# Patient Record
Sex: FEMALE | Race: WHITE | NOT HISPANIC OR LATINO | Employment: OTHER | URBAN - METROPOLITAN AREA
[De-identification: names, ages, dates, MRNs, and addresses within clinical notes are randomized per-mention and may not be internally consistent; named-entity substitution may affect disease eponyms.]

---

## 2019-01-01 ENCOUNTER — HOSPITAL ENCOUNTER (INPATIENT)
Facility: HOSPITAL | Age: 84
LOS: 2 days | Discharge: NURSING, REHAB OR LONG-TERM CARE | DRG: 641 | End: 2019-08-24
Attending: EMERGENCY MEDICINE | Admitting: INTERNAL MEDICINE
Payer: MEDICARE

## 2019-01-01 ENCOUNTER — APPOINTMENT (EMERGENCY)
Dept: RADIOLOGY | Facility: HOSPITAL | Age: 84
End: 2019-01-01
Payer: MEDICARE

## 2019-01-01 ENCOUNTER — APPOINTMENT (EMERGENCY)
Dept: RADIOLOGY | Facility: HOSPITAL | Age: 84
DRG: 641 | End: 2019-01-01
Payer: MEDICARE

## 2019-01-01 ENCOUNTER — HOSPITAL ENCOUNTER (EMERGENCY)
Facility: HOSPITAL | Age: 84
Discharge: HOME | End: 2019-07-12
Attending: EMERGENCY MEDICINE | Admitting: EMERGENCY MEDICINE
Payer: MEDICARE

## 2019-01-01 ENCOUNTER — CONSULT (OUTPATIENT)
Dept: CARDIOLOGY CLINIC | Facility: CLINIC | Age: 84
End: 2019-01-01
Payer: MEDICARE

## 2019-01-01 ENCOUNTER — APPOINTMENT (INPATIENT)
Dept: NON INVASIVE DIAGNOSTICS | Facility: HOSPITAL | Age: 84
DRG: 641 | End: 2019-01-01
Payer: MEDICARE

## 2019-01-01 VITALS
SYSTOLIC BLOOD PRESSURE: 124 MMHG | HEART RATE: 62 BPM | WEIGHT: 163.8 LBS | BODY MASS INDEX: 29.02 KG/M2 | OXYGEN SATURATION: 97 % | DIASTOLIC BLOOD PRESSURE: 60 MMHG | HEIGHT: 63 IN

## 2019-01-01 VITALS
WEIGHT: 167.99 LBS | HEART RATE: 84 BPM | TEMPERATURE: 98 F | HEIGHT: 63 IN | RESPIRATION RATE: 18 BRPM | BODY MASS INDEX: 29.77 KG/M2 | DIASTOLIC BLOOD PRESSURE: 72 MMHG | SYSTOLIC BLOOD PRESSURE: 148 MMHG | OXYGEN SATURATION: 95 %

## 2019-01-01 VITALS
TEMPERATURE: 99 F | RESPIRATION RATE: 16 BRPM | OXYGEN SATURATION: 96 % | BODY MASS INDEX: 31.89 KG/M2 | HEART RATE: 73 BPM | WEIGHT: 180 LBS | SYSTOLIC BLOOD PRESSURE: 140 MMHG | DIASTOLIC BLOOD PRESSURE: 70 MMHG

## 2019-01-01 DIAGNOSIS — I48.0 PAROXYSMAL ATRIAL FIBRILLATION (HCC): ICD-10-CM

## 2019-01-01 DIAGNOSIS — E87.6 HYPOKALEMIA: ICD-10-CM

## 2019-01-01 DIAGNOSIS — I48.0 PAROXYSMAL ATRIAL FIBRILLATION (HCC): Primary | ICD-10-CM

## 2019-01-01 DIAGNOSIS — F03.90 DEMENTIA (HCC): Primary | ICD-10-CM

## 2019-01-01 DIAGNOSIS — R05.9 COUGH: ICD-10-CM

## 2019-01-01 DIAGNOSIS — I48.91 ATRIAL FIBRILLATION WITH RVR (HCC): Primary | ICD-10-CM

## 2019-01-01 LAB
ALBUMIN SERPL BCP-MCNC: 3.2 G/DL (ref 3.5–5)
ALBUMIN SERPL BCP-MCNC: 4 G/DL (ref 3–5.2)
ALP SERPL-CCNC: 61 U/L (ref 46–116)
ALP SERPL-CCNC: 62 U/L (ref 43–122)
ALT SERPL W P-5'-P-CCNC: 17 U/L (ref 12–78)
ALT SERPL W P-5'-P-CCNC: 18 U/L (ref 9–52)
AMPHETAMINES SERPL QL SCN: NEGATIVE
ANION GAP SERPL CALCULATED.3IONS-SCNC: 10 MMOL/L (ref 5–14)
ANION GAP SERPL CALCULATED.3IONS-SCNC: 8 MMOL/L (ref 4–13)
ANION GAP SERPL CALCULATED.3IONS-SCNC: 8 MMOL/L (ref 4–13)
ANION GAP SERPL CALCULATED.3IONS-SCNC: 9 MMOL/L (ref 4–13)
AST SERPL W P-5'-P-CCNC: 15 U/L (ref 5–45)
AST SERPL W P-5'-P-CCNC: 24 U/L (ref 14–36)
ATRIAL RATE: 72 BPM
ATRIAL RATE: 74 BPM
ATRIAL RATE: 75 BPM
BACTERIA UR QL AUTO: ABNORMAL /HPF
BARBITURATES UR QL: NEGATIVE
BASOPHILS # BLD AUTO: 0.07 THOUSANDS/ΜL (ref 0–0.1)
BASOPHILS # BLD AUTO: 0.08 THOUSANDS/ΜL (ref 0–0.1)
BASOPHILS # BLD AUTO: 0.1 THOUSANDS/ΜL (ref 0–0.1)
BASOPHILS NFR BLD AUTO: 1 % (ref 0–1)
BENZODIAZ UR QL: NEGATIVE
BILIRUB SERPL-MCNC: 0.22 MG/DL (ref 0.2–1)
BILIRUB SERPL-MCNC: 0.4 MG/DL
BILIRUB UR QL STRIP: NEGATIVE
BUN SERPL-MCNC: 10 MG/DL (ref 5–25)
BUN SERPL-MCNC: 12 MG/DL (ref 5–25)
BUN SERPL-MCNC: 7 MG/DL (ref 5–25)
BUN SERPL-MCNC: 7 MG/DL (ref 5–25)
CALCIUM SERPL-MCNC: 9.2 MG/DL (ref 8.3–10.1)
CALCIUM SERPL-MCNC: 9.2 MG/DL (ref 8.3–10.1)
CALCIUM SERPL-MCNC: 9.3 MG/DL (ref 8.4–10.2)
CALCIUM SERPL-MCNC: 9.4 MG/DL (ref 8.3–10.1)
CHLORIDE SERPL-SCNC: 100 MMOL/L (ref 100–108)
CHLORIDE SERPL-SCNC: 106 MMOL/L (ref 100–108)
CHLORIDE SERPL-SCNC: 106 MMOL/L (ref 100–108)
CHLORIDE SERPL-SCNC: 96 MMOL/L (ref 97–108)
CLARITY UR: CLEAR
CO2 SERPL-SCNC: 27 MMOL/L (ref 21–32)
CO2 SERPL-SCNC: 29 MMOL/L (ref 21–32)
CO2 SERPL-SCNC: 30 MMOL/L (ref 21–32)
CO2 SERPL-SCNC: 31 MMOL/L (ref 22–30)
COCAINE UR QL: NEGATIVE
COLOR UR: ABNORMAL
CREAT SERPL-MCNC: 0.58 MG/DL (ref 0.6–1.2)
CREAT SERPL-MCNC: 0.64 MG/DL (ref 0.6–1.3)
CREAT SERPL-MCNC: 0.7 MG/DL (ref 0.6–1.3)
CREAT SERPL-MCNC: 0.84 MG/DL (ref 0.6–1.3)
EOSINOPHIL # BLD AUTO: 0.3 THOUSAND/ΜL (ref 0–0.4)
EOSINOPHIL # BLD AUTO: 0.76 THOUSAND/ΜL (ref 0–0.61)
EOSINOPHIL # BLD AUTO: 0.76 THOUSAND/ΜL (ref 0–0.61)
EOSINOPHIL NFR BLD AUTO: 2 % (ref 0–6)
EOSINOPHIL NFR BLD AUTO: 7 % (ref 0–6)
EOSINOPHIL NFR BLD AUTO: 9 % (ref 0–6)
ERYTHROCYTE [DISTWIDTH] IN BLOOD BY AUTOMATED COUNT: 13.2 % (ref 11.6–15.1)
ERYTHROCYTE [DISTWIDTH] IN BLOOD BY AUTOMATED COUNT: 13.3 % (ref 11.6–15.1)
ERYTHROCYTE [DISTWIDTH] IN BLOOD BY AUTOMATED COUNT: 13.3 % (ref 11.6–15.1)
ERYTHROCYTE [DISTWIDTH] IN BLOOD BY AUTOMATED COUNT: 14.8 %
ETHANOL SERPL-MCNC: <10 MG/DL (ref 0–10)
GFR SERPL CREATININE-BSD FRML MDRD: 62 ML/MIN/1.73SQ M
GFR SERPL CREATININE-BSD FRML MDRD: 77 ML/MIN/1.73SQ M
GFR SERPL CREATININE-BSD FRML MDRD: 79 ML/MIN/1.73SQ M
GFR SERPL CREATININE-BSD FRML MDRD: 83 ML/MIN/1.73SQ M
GLUCOSE SERPL-MCNC: 109 MG/DL (ref 65–140)
GLUCOSE SERPL-MCNC: 127 MG/DL (ref 65–140)
GLUCOSE SERPL-MCNC: 145 MG/DL (ref 70–99)
GLUCOSE SERPL-MCNC: 161 MG/DL (ref 65–140)
GLUCOSE SERPL-MCNC: 179 MG/DL (ref 65–140)
GLUCOSE UR STRIP-MCNC: NEGATIVE MG/DL
HCT VFR BLD AUTO: 37.6 % (ref 34.8–46.1)
HCT VFR BLD AUTO: 38.8 % (ref 34.8–46.1)
HCT VFR BLD AUTO: 38.8 % (ref 34.8–46.1)
HCT VFR BLD AUTO: 38.8 % (ref 36–46)
HGB BLD-MCNC: 12.2 G/DL (ref 11.5–15.4)
HGB BLD-MCNC: 12.4 G/DL (ref 11.5–15.4)
HGB BLD-MCNC: 13 G/DL (ref 11.5–15.4)
HGB BLD-MCNC: 13.2 G/DL (ref 12–16)
HGB UR QL STRIP.AUTO: NEGATIVE
HYALINE CASTS #/AREA URNS LPF: ABNORMAL /LPF
IMM GRANULOCYTES # BLD AUTO: 0.05 THOUSAND/UL (ref 0–0.2)
IMM GRANULOCYTES # BLD AUTO: 0.06 THOUSAND/UL (ref 0–0.2)
IMM GRANULOCYTES NFR BLD AUTO: 1 % (ref 0–2)
IMM GRANULOCYTES NFR BLD AUTO: 1 % (ref 0–2)
KETONES UR STRIP-MCNC: NEGATIVE MG/DL
LEUKOCYTE ESTERASE UR QL STRIP: 100
LYMPHOCYTES # BLD AUTO: 1.2 THOUSANDS/ΜL (ref 0.5–4)
LYMPHOCYTES # BLD AUTO: 2 THOUSANDS/ΜL (ref 0.6–4.47)
LYMPHOCYTES # BLD AUTO: 2.24 THOUSANDS/ΜL (ref 0.6–4.47)
LYMPHOCYTES NFR BLD AUTO: 10 % (ref 25–45)
LYMPHOCYTES NFR BLD AUTO: 21 % (ref 14–44)
LYMPHOCYTES NFR BLD AUTO: 22 % (ref 14–44)
MAGNESIUM SERPL-MCNC: 1.8 MG/DL (ref 1.6–2.3)
MCH RBC QN AUTO: 29.4 PG (ref 26.8–34.3)
MCH RBC QN AUTO: 29.7 PG (ref 26.8–34.3)
MCH RBC QN AUTO: 30.1 PG (ref 26.8–34.3)
MCH RBC QN AUTO: 30.1 PG (ref 26.8–34.3)
MCHC RBC AUTO-ENTMCNC: 31.4 G/DL (ref 31.4–37.4)
MCHC RBC AUTO-ENTMCNC: 33 G/DL (ref 31.4–37.4)
MCHC RBC AUTO-ENTMCNC: 33.5 G/DL (ref 31.4–37.4)
MCHC RBC AUTO-ENTMCNC: 34.2 G/DL (ref 31.4–37.4)
MCV RBC AUTO: 87 FL (ref 80–100)
MCV RBC AUTO: 90 FL (ref 82–98)
MCV RBC AUTO: 91 FL (ref 82–98)
MCV RBC AUTO: 94 FL (ref 82–98)
METHADONE UR QL: NEGATIVE
MONOCYTES # BLD AUTO: 0.7 THOUSAND/ΜL (ref 0.2–0.9)
MONOCYTES # BLD AUTO: 0.76 THOUSAND/ΜL (ref 0.17–1.22)
MONOCYTES # BLD AUTO: 0.88 THOUSAND/ΜL (ref 0.17–1.22)
MONOCYTES NFR BLD AUTO: 5 % (ref 1–10)
MONOCYTES NFR BLD AUTO: 8 % (ref 4–12)
MONOCYTES NFR BLD AUTO: 9 % (ref 4–12)
MRSA NOSE QL CULT: ABNORMAL
MRSA NOSE QL CULT: ABNORMAL
NEUTROPHILS # BLD AUTO: 10.6 THOUSANDS/ΜL (ref 1.8–7.8)
NEUTROPHILS # BLD AUTO: 5.34 THOUSANDS/ΜL (ref 1.85–7.62)
NEUTROPHILS # BLD AUTO: 6.76 THOUSANDS/ΜL (ref 1.85–7.62)
NEUTS SEG NFR BLD AUTO: 58 % (ref 43–75)
NEUTS SEG NFR BLD AUTO: 62 % (ref 43–75)
NEUTS SEG NFR BLD AUTO: 83 % (ref 45–65)
NITRITE UR QL STRIP: NEGATIVE
NON-SQ EPI CELLS URNS QL MICRO: ABNORMAL /HPF
NRBC BLD AUTO-RTO: 0 /100 WBCS
NRBC BLD AUTO-RTO: 0 /100 WBCS
OPIATES UR QL SCN: NEGATIVE
P AXIS: 45 DEGREES
P AXIS: 69 DEGREES
PCP UR QL: NEGATIVE
PH UR STRIP.AUTO: 7 [PH]
PLATELET # BLD AUTO: 220 THOUSANDS/UL (ref 150–450)
PLATELET # BLD AUTO: 221 THOUSANDS/UL (ref 149–390)
PLATELET # BLD AUTO: 231 THOUSANDS/UL (ref 149–390)
PLATELET # BLD AUTO: 235 THOUSANDS/UL (ref 149–390)
PMV BLD AUTO: 7.2 FL (ref 8.9–12.7)
PMV BLD AUTO: 8.8 FL (ref 8.9–12.7)
PMV BLD AUTO: 8.9 FL (ref 8.9–12.7)
PMV BLD AUTO: 9 FL (ref 8.9–12.7)
POTASSIUM SERPL-SCNC: 2.5 MMOL/L (ref 3.5–5.3)
POTASSIUM SERPL-SCNC: 2.9 MMOL/L (ref 3.5–5.3)
POTASSIUM SERPL-SCNC: 2.9 MMOL/L (ref 3.5–5.3)
POTASSIUM SERPL-SCNC: 3 MMOL/L (ref 3.6–5)
POTASSIUM SERPL-SCNC: 4.1 MMOL/L (ref 3.5–5.3)
PR INTERVAL: 138 MS
PR INTERVAL: 154 MS
PROT SERPL-MCNC: 6.7 G/DL (ref 6.4–8.2)
PROT SERPL-MCNC: 7 G/DL (ref 5.9–8.4)
PROT UR STRIP-MCNC: ABNORMAL MG/DL
QRS AXIS: 2 DEGREES
QRS AXIS: 32 DEGREES
QRS AXIS: 39 DEGREES
QRSD INTERVAL: 76 MS
QRSD INTERVAL: 82 MS
QRSD INTERVAL: 92 MS
QT INTERVAL: 312 MS
QT INTERVAL: 440 MS
QT INTERVAL: 454 MS
QTC INTERVAL: 430 MS
QTC INTERVAL: 481 MS
QTC INTERVAL: 503 MS
RBC # BLD AUTO: 4.12 MILLION/UL (ref 3.81–5.12)
RBC # BLD AUTO: 4.15 MILLION/UL (ref 3.81–5.12)
RBC # BLD AUTO: 4.32 MILLION/UL (ref 3.81–5.12)
RBC # BLD AUTO: 4.46 MILLION/UL (ref 4–5.2)
RBC #/AREA URNS AUTO: ABNORMAL /HPF
SODIUM SERPL-SCNC: 137 MMOL/L (ref 137–147)
SODIUM SERPL-SCNC: 138 MMOL/L (ref 136–145)
SODIUM SERPL-SCNC: 141 MMOL/L (ref 136–145)
SODIUM SERPL-SCNC: 144 MMOL/L (ref 136–145)
SP GR UR STRIP.AUTO: 1.01 (ref 1–1.04)
T WAVE AXIS: -72 DEGREES
T WAVE AXIS: 25 DEGREES
T WAVE AXIS: 57 DEGREES
THC UR QL: NEGATIVE
TROPONIN I SERPL-MCNC: <0.02 NG/ML
TSH SERPL DL<=0.05 MIU/L-ACNC: 1.49 UIU/ML (ref 0.47–4.68)
UROBILINOGEN UA: NEGATIVE MG/DL
VENTRICULAR RATE: 114 BPM
VENTRICULAR RATE: 72 BPM
VENTRICULAR RATE: 74 BPM
WBC # BLD AUTO: 10.78 THOUSAND/UL (ref 4.31–10.16)
WBC # BLD AUTO: 12.8 THOUSAND/UL (ref 4.31–10.16)
WBC # BLD AUTO: 8.87 THOUSAND/UL (ref 4.31–10.16)
WBC # BLD AUTO: 8.98 THOUSAND/UL (ref 4.31–10.16)
WBC #/AREA URNS AUTO: ABNORMAL /HPF

## 2019-01-01 PROCEDURE — 93010 ELECTROCARDIOGRAM REPORT: CPT | Performed by: INTERNAL MEDICINE

## 2019-01-01 PROCEDURE — 80053 COMPREHEN METABOLIC PANEL: CPT | Performed by: EMERGENCY MEDICINE

## 2019-01-01 PROCEDURE — 82948 REAGENT STRIP/BLOOD GLUCOSE: CPT

## 2019-01-01 PROCEDURE — 36415 COLL VENOUS BLD VENIPUNCTURE: CPT | Performed by: EMERGENCY MEDICINE

## 2019-01-01 PROCEDURE — 93005 ELECTROCARDIOGRAM TRACING: CPT

## 2019-01-01 PROCEDURE — 99285 EMERGENCY DEPT VISIT HI MDM: CPT

## 2019-01-01 PROCEDURE — 84484 ASSAY OF TROPONIN QUANT: CPT | Performed by: EMERGENCY MEDICINE

## 2019-01-01 PROCEDURE — 97163 PT EVAL HIGH COMPLEX 45 MIN: CPT

## 2019-01-01 PROCEDURE — 99214 OFFICE O/P EST MOD 30 MIN: CPT

## 2019-01-01 PROCEDURE — 36415 COLL VENOUS BLD VENIPUNCTURE: CPT

## 2019-01-01 PROCEDURE — 85025 COMPLETE CBC W/AUTO DIFF WBC: CPT | Performed by: EMERGENCY MEDICINE

## 2019-01-01 PROCEDURE — 99291 CRITICAL CARE FIRST HOUR: CPT | Performed by: EMERGENCY MEDICINE

## 2019-01-01 PROCEDURE — 85027 COMPLETE CBC AUTOMATED: CPT | Performed by: INTERNAL MEDICINE

## 2019-01-01 PROCEDURE — G8978 MOBILITY CURRENT STATUS: HCPCS

## 2019-01-01 PROCEDURE — 81001 URINALYSIS AUTO W/SCOPE: CPT | Performed by: EMERGENCY MEDICINE

## 2019-01-01 PROCEDURE — 99223 1ST HOSP IP/OBS HIGH 75: CPT | Performed by: INTERNAL MEDICINE

## 2019-01-01 PROCEDURE — 83735 ASSAY OF MAGNESIUM: CPT | Performed by: EMERGENCY MEDICINE

## 2019-01-01 PROCEDURE — 99222 1ST HOSP IP/OBS MODERATE 55: CPT | Performed by: INTERNAL MEDICINE

## 2019-01-01 PROCEDURE — 84443 ASSAY THYROID STIM HORMONE: CPT | Performed by: EMERGENCY MEDICINE

## 2019-01-01 PROCEDURE — NC001 PR NO CHARGE: Performed by: NURSE PRACTITIONER

## 2019-01-01 PROCEDURE — 84132 ASSAY OF SERUM POTASSIUM: CPT | Performed by: INTERNAL MEDICINE

## 2019-01-01 PROCEDURE — 80048 BASIC METABOLIC PNL TOTAL CA: CPT | Performed by: INTERNAL MEDICINE

## 2019-01-01 PROCEDURE — 93306 TTE W/DOPPLER COMPLETE: CPT

## 2019-01-01 PROCEDURE — G8980 MOBILITY D/C STATUS: HCPCS

## 2019-01-01 PROCEDURE — 99284 EMERGENCY DEPT VISIT MOD MDM: CPT | Performed by: EMERGENCY MEDICINE

## 2019-01-01 PROCEDURE — 99232 SBSQ HOSP IP/OBS MODERATE 35: CPT

## 2019-01-01 PROCEDURE — G8979 MOBILITY GOAL STATUS: HCPCS

## 2019-01-01 PROCEDURE — 87081 CULTURE SCREEN ONLY: CPT | Performed by: INTERNAL MEDICINE

## 2019-01-01 PROCEDURE — 1123F ACP DISCUSS/DSCN MKR DOCD: CPT

## 2019-01-01 PROCEDURE — 99232 SBSQ HOSP IP/OBS MODERATE 35: CPT | Performed by: INTERNAL MEDICINE

## 2019-01-01 PROCEDURE — 96360 HYDRATION IV INFUSION INIT: CPT

## 2019-01-01 PROCEDURE — 99239 HOSP IP/OBS DSCHRG MGMT >30: CPT | Performed by: INTERNAL MEDICINE

## 2019-01-01 PROCEDURE — 71045 X-RAY EXAM CHEST 1 VIEW: CPT

## 2019-01-01 PROCEDURE — 85025 COMPLETE CBC W/AUTO DIFF WBC: CPT | Performed by: INTERNAL MEDICINE

## 2019-01-01 PROCEDURE — 87147 CULTURE TYPE IMMUNOLOGIC: CPT | Performed by: INTERNAL MEDICINE

## 2019-01-01 PROCEDURE — 80307 DRUG TEST PRSMV CHEM ANLYZR: CPT | Performed by: EMERGENCY MEDICINE

## 2019-01-01 PROCEDURE — 93000 ELECTROCARDIOGRAM COMPLETE: CPT

## 2019-01-01 PROCEDURE — 71046 X-RAY EXAM CHEST 2 VIEWS: CPT

## 2019-01-01 PROCEDURE — 80320 DRUG SCREEN QUANTALCOHOLS: CPT | Performed by: EMERGENCY MEDICINE

## 2019-01-01 RX ORDER — POTASSIUM CHLORIDE 20MEQ/15ML
20 LIQUID (ML) ORAL ONCE
Status: COMPLETED | OUTPATIENT
Start: 2019-01-01 | End: 2019-01-01

## 2019-01-01 RX ORDER — POTASSIUM CHLORIDE 20MEQ/15ML
40 LIQUID (ML) ORAL ONCE
Status: COMPLETED | OUTPATIENT
Start: 2019-01-01 | End: 2019-01-01

## 2019-01-01 RX ORDER — PREDNISONE 1 MG/1
5 TABLET ORAL DAILY
Status: DISCONTINUED | OUTPATIENT
Start: 2019-01-01 | End: 2019-01-01 | Stop reason: HOSPADM

## 2019-01-01 RX ORDER — MINERAL OIL 100 G/100G
1 OIL RECTAL DAILY PRN
COMMUNITY

## 2019-01-01 RX ORDER — PANTOPRAZOLE SODIUM 40 MG/1
40 TABLET, DELAYED RELEASE ORAL
Status: DISCONTINUED | OUTPATIENT
Start: 2019-01-01 | End: 2019-01-01 | Stop reason: HOSPADM

## 2019-01-01 RX ORDER — METOPROLOL TARTRATE 50 MG/1
50 TABLET, FILM COATED ORAL 2 TIMES DAILY
COMMUNITY

## 2019-01-01 RX ORDER — POTASSIUM CHLORIDE 20 MEQ/1
40 TABLET, EXTENDED RELEASE ORAL 2 TIMES DAILY
Status: COMPLETED | OUTPATIENT
Start: 2019-01-01 | End: 2019-01-01

## 2019-01-01 RX ORDER — PREDNISONE 1 MG/1
TABLET ORAL
COMMUNITY

## 2019-01-01 RX ORDER — LORAZEPAM 0.5 MG/1
0.5 TABLET ORAL EVERY 8 HOURS PRN
Status: DISCONTINUED | OUTPATIENT
Start: 2019-01-01 | End: 2019-01-01 | Stop reason: HOSPADM

## 2019-01-01 RX ORDER — BENZONATATE 100 MG/1
100 CAPSULE ORAL EVERY 8 HOURS
Qty: 21 CAPSULE | Refills: 0 | Status: SHIPPED | OUTPATIENT
Start: 2019-01-01 | End: 2019-01-01

## 2019-01-01 RX ORDER — DONEPEZIL HYDROCHLORIDE 10 MG/1
10 TABLET, FILM COATED ORAL
COMMUNITY

## 2019-01-01 RX ORDER — DONEPEZIL HYDROCHLORIDE 10 MG/1
10 TABLET, FILM COATED ORAL
Status: DISCONTINUED | OUTPATIENT
Start: 2019-01-01 | End: 2019-01-01 | Stop reason: HOSPADM

## 2019-01-01 RX ORDER — POTASSIUM CHLORIDE 14.9 MG/ML
20 INJECTION INTRAVENOUS ONCE
Status: COMPLETED | OUTPATIENT
Start: 2019-01-01 | End: 2019-01-01

## 2019-01-01 RX ORDER — AMLODIPINE BESYLATE 10 MG/1
10 TABLET ORAL DAILY
Status: DISCONTINUED | OUTPATIENT
Start: 2019-01-01 | End: 2019-01-01 | Stop reason: HOSPADM

## 2019-01-01 RX ORDER — LANOLIN ALCOHOL/MO/W.PET/CERES
3 CREAM (GRAM) TOPICAL
COMMUNITY

## 2019-01-01 RX ORDER — DULOXETIN HYDROCHLORIDE 60 MG/1
CAPSULE, DELAYED RELEASE ORAL
COMMUNITY
End: 2019-01-01

## 2019-01-01 RX ORDER — LANOLIN ALCOHOL/MO/W.PET/CERES
3 CREAM (GRAM) TOPICAL
Status: DISCONTINUED | OUTPATIENT
Start: 2019-01-01 | End: 2019-01-01 | Stop reason: HOSPADM

## 2019-01-01 RX ORDER — DULOXETIN HYDROCHLORIDE 30 MG/1
30 CAPSULE, DELAYED RELEASE ORAL DAILY
Status: DISCONTINUED | OUTPATIENT
Start: 2019-01-01 | End: 2019-01-01 | Stop reason: HOSPADM

## 2019-01-01 RX ORDER — POTASSIUM CHLORIDE 750 MG/1
40 TABLET, EXTENDED RELEASE ORAL ONCE
Status: DISCONTINUED | OUTPATIENT
Start: 2019-01-01 | End: 2019-01-01

## 2019-01-01 RX ORDER — BISACODYL 10 MG
10 SUPPOSITORY, RECTAL RECTAL
COMMUNITY

## 2019-01-01 RX ORDER — POTASSIUM CHLORIDE 14.9 MG/ML
20 INJECTION INTRAVENOUS ONCE
Status: DISCONTINUED | OUTPATIENT
Start: 2019-01-01 | End: 2019-01-01

## 2019-01-01 RX ORDER — METOPROLOL TARTRATE 50 MG/1
50 TABLET, FILM COATED ORAL EVERY 12 HOURS SCHEDULED
Status: DISCONTINUED | OUTPATIENT
Start: 2019-01-01 | End: 2019-01-01 | Stop reason: HOSPADM

## 2019-01-01 RX ORDER — HYDROCHLOROTHIAZIDE 12.5 MG/1
25 TABLET ORAL
COMMUNITY
End: 2019-01-01 | Stop reason: HOSPADM

## 2019-01-01 RX ORDER — AMLODIPINE BESYLATE 10 MG/1
TABLET ORAL
COMMUNITY
End: 2019-01-01

## 2019-01-01 RX ORDER — LORAZEPAM 0.5 MG/1
TABLET ORAL
COMMUNITY

## 2019-01-01 RX ADMIN — PREDNISONE 5 MG: 5 TABLET ORAL at 12:08

## 2019-01-01 RX ADMIN — PANTOPRAZOLE SODIUM 40 MG: 40 TABLET, DELAYED RELEASE ORAL at 06:06

## 2019-01-01 RX ADMIN — MELATONIN 3 MG: 3 TAB ORAL at 21:53

## 2019-01-01 RX ADMIN — DULOXETINE HYDROCHLORIDE 30 MG: 30 CAPSULE, DELAYED RELEASE ORAL at 09:54

## 2019-01-01 RX ADMIN — POTASSIUM CHLORIDE 20 MEQ: 20 SOLUTION ORAL at 08:11

## 2019-01-01 RX ADMIN — METOPROLOL TARTRATE 50 MG: 50 TABLET, FILM COATED ORAL at 09:54

## 2019-01-01 RX ADMIN — PREDNISONE 5 MG: 5 TABLET ORAL at 09:54

## 2019-01-01 RX ADMIN — METOPROLOL TARTRATE 50 MG: 50 TABLET, FILM COATED ORAL at 09:31

## 2019-01-01 RX ADMIN — PANTOPRAZOLE SODIUM 40 MG: 40 TABLET, DELAYED RELEASE ORAL at 06:01

## 2019-01-01 RX ADMIN — DULOXETINE HYDROCHLORIDE 30 MG: 30 CAPSULE, DELAYED RELEASE ORAL at 12:08

## 2019-01-01 RX ADMIN — POTASSIUM CHLORIDE 40 MEQ: 1500 TABLET, EXTENDED RELEASE ORAL at 09:55

## 2019-01-01 RX ADMIN — AMLODIPINE BESYLATE 10 MG: 10 TABLET ORAL at 12:08

## 2019-01-01 RX ADMIN — APIXABAN 2.5 MG: 2.5 TABLET, FILM COATED ORAL at 12:08

## 2019-01-01 RX ADMIN — LORAZEPAM 0.5 MG: 0.5 TABLET ORAL at 21:53

## 2019-01-01 RX ADMIN — APIXABAN 2.5 MG: 2.5 TABLET, FILM COATED ORAL at 09:31

## 2019-01-01 RX ADMIN — DONEPEZIL HYDROCHLORIDE 10 MG: 10 TABLET, FILM COATED ORAL at 21:42

## 2019-01-01 RX ADMIN — POTASSIUM CHLORIDE 20 MEQ: 200 INJECTION, SOLUTION INTRAVENOUS at 06:55

## 2019-01-01 RX ADMIN — DULOXETINE HYDROCHLORIDE 30 MG: 30 CAPSULE, DELAYED RELEASE ORAL at 09:31

## 2019-01-01 RX ADMIN — POTASSIUM CHLORIDE 40 MEQ: 1500 TABLET, EXTENDED RELEASE ORAL at 17:53

## 2019-01-01 RX ADMIN — AMLODIPINE BESYLATE 10 MG: 10 TABLET ORAL at 09:31

## 2019-01-01 RX ADMIN — POTASSIUM CHLORIDE 20 MEQ: 20 SOLUTION ORAL at 06:37

## 2019-01-01 RX ADMIN — MELATONIN 3 MG: 3 TAB ORAL at 21:43

## 2019-01-01 RX ADMIN — AMLODIPINE BESYLATE 10 MG: 10 TABLET ORAL at 09:54

## 2019-01-01 RX ADMIN — METOPROLOL TARTRATE 50 MG: 50 TABLET, FILM COATED ORAL at 12:08

## 2019-01-01 RX ADMIN — DONEPEZIL HYDROCHLORIDE 10 MG: 10 TABLET, FILM COATED ORAL at 21:53

## 2019-01-01 RX ADMIN — PANTOPRAZOLE SODIUM 40 MG: 40 TABLET, DELAYED RELEASE ORAL at 12:08

## 2019-01-01 RX ADMIN — METOPROLOL TARTRATE 50 MG: 50 TABLET, FILM COATED ORAL at 21:53

## 2019-01-01 RX ADMIN — POTASSIUM CHLORIDE 20 MEQ: 200 INJECTION, SOLUTION INTRAVENOUS at 08:38

## 2019-01-01 RX ADMIN — APIXABAN 2.5 MG: 2.5 TABLET, FILM COATED ORAL at 18:24

## 2019-01-01 RX ADMIN — APIXABAN 2.5 MG: 2.5 TABLET, FILM COATED ORAL at 09:54

## 2019-01-01 RX ADMIN — POTASSIUM CHLORIDE 40 MEQ: 20 SOLUTION ORAL at 17:36

## 2019-01-01 RX ADMIN — PREDNISONE 5 MG: 5 TABLET ORAL at 09:31

## 2019-01-01 RX ADMIN — APIXABAN 2.5 MG: 2.5 TABLET, FILM COATED ORAL at 17:53

## 2019-01-01 RX ADMIN — SODIUM CHLORIDE 500 ML: 0.9 INJECTION, SOLUTION INTRAVENOUS at 07:11

## 2019-01-01 RX ADMIN — METOPROLOL TARTRATE 50 MG: 50 TABLET, FILM COATED ORAL at 21:43

## 2019-02-05 ENCOUNTER — APPOINTMENT (EMERGENCY)
Dept: RADIOLOGY | Facility: HOSPITAL | Age: 84
DRG: 206 | End: 2019-02-05
Payer: MEDICARE

## 2019-02-05 ENCOUNTER — HOSPITAL ENCOUNTER (INPATIENT)
Facility: HOSPITAL | Age: 84
LOS: 2 days | Discharge: NURSING, REHAB OR LONG-TERM CARE | DRG: 206 | End: 2019-02-07
Attending: EMERGENCY MEDICINE | Admitting: INTERNAL MEDICINE
Payer: MEDICARE

## 2019-02-05 DIAGNOSIS — R06.03 RESPIRATORY DISTRESS: Primary | ICD-10-CM

## 2019-02-05 DIAGNOSIS — R23.0 CYANOSIS: ICD-10-CM

## 2019-02-05 PROBLEM — F03.90 DEMENTIA (HCC): Status: ACTIVE | Noted: 2019-02-05

## 2019-02-05 PROBLEM — I10 ESSENTIAL HYPERTENSION: Status: ACTIVE | Noted: 2017-04-20

## 2019-02-05 PROBLEM — K22.70 BARRETT'S ESOPHAGUS: Status: ACTIVE | Noted: 2019-02-05

## 2019-02-05 PROBLEM — R09.89: Status: ACTIVE | Noted: 2019-02-05

## 2019-02-05 PROBLEM — M31.6 TEMPORAL ARTERITIS (HCC): Status: ACTIVE | Noted: 2017-04-20

## 2019-02-05 PROBLEM — K21.9 GERD (GASTROESOPHAGEAL REFLUX DISEASE): Status: ACTIVE | Noted: 2019-02-05

## 2019-02-05 LAB
ANION GAP SERPL CALCULATED.3IONS-SCNC: 10 MMOL/L (ref 4–13)
BASOPHILS # BLD AUTO: 0.08 THOUSANDS/ΜL (ref 0–0.1)
BASOPHILS NFR BLD AUTO: 1 % (ref 0–1)
BUN SERPL-MCNC: 16 MG/DL (ref 5–25)
CALCIUM SERPL-MCNC: 9.2 MG/DL (ref 8.3–10.1)
CHLORIDE SERPL-SCNC: 100 MMOL/L (ref 100–108)
CO2 SERPL-SCNC: 28 MMOL/L (ref 21–32)
CREAT SERPL-MCNC: 0.97 MG/DL (ref 0.6–1.3)
EOSINOPHIL # BLD AUTO: 0.3 THOUSAND/ΜL (ref 0–0.61)
EOSINOPHIL NFR BLD AUTO: 3 % (ref 0–6)
ERYTHROCYTE [DISTWIDTH] IN BLOOD BY AUTOMATED COUNT: 13.9 % (ref 11.6–15.1)
GFR SERPL CREATININE-BSD FRML MDRD: 52 ML/MIN/1.73SQ M
GLUCOSE SERPL-MCNC: 147 MG/DL (ref 65–140)
HCT VFR BLD AUTO: 37.9 % (ref 34.8–46.1)
HGB BLD-MCNC: 12.1 G/DL (ref 11.5–15.4)
IMM GRANULOCYTES # BLD AUTO: 0.07 THOUSAND/UL (ref 0–0.2)
IMM GRANULOCYTES NFR BLD AUTO: 1 % (ref 0–2)
LYMPHOCYTES # BLD AUTO: 0.63 THOUSANDS/ΜL (ref 0.6–4.47)
LYMPHOCYTES NFR BLD AUTO: 5 % (ref 14–44)
MAGNESIUM SERPL-MCNC: 1.9 MG/DL (ref 1.6–2.6)
MCH RBC QN AUTO: 29.2 PG (ref 26.8–34.3)
MCHC RBC AUTO-ENTMCNC: 31.9 G/DL (ref 31.4–37.4)
MCV RBC AUTO: 91 FL (ref 82–98)
MONOCYTES # BLD AUTO: 0.69 THOUSAND/ΜL (ref 0.17–1.22)
MONOCYTES NFR BLD AUTO: 6 % (ref 4–12)
NEUTROPHILS # BLD AUTO: 10.08 THOUSANDS/ΜL (ref 1.85–7.62)
NEUTS SEG NFR BLD AUTO: 84 % (ref 43–75)
NRBC BLD AUTO-RTO: 0 /100 WBCS
PLATELET # BLD AUTO: 199 THOUSANDS/UL (ref 149–390)
PMV BLD AUTO: 9.4 FL (ref 8.9–12.7)
POTASSIUM SERPL-SCNC: 3.6 MMOL/L (ref 3.5–5.3)
RBC # BLD AUTO: 4.15 MILLION/UL (ref 3.81–5.12)
SODIUM SERPL-SCNC: 138 MMOL/L (ref 136–145)
TROPONIN I SERPL-MCNC: 0.04 NG/ML
WBC # BLD AUTO: 11.85 THOUSAND/UL (ref 4.31–10.16)

## 2019-02-05 PROCEDURE — 99223 1ST HOSP IP/OBS HIGH 75: CPT | Performed by: INTERNAL MEDICINE

## 2019-02-05 PROCEDURE — 71045 X-RAY EXAM CHEST 1 VIEW: CPT

## 2019-02-05 PROCEDURE — 93005 ELECTROCARDIOGRAM TRACING: CPT

## 2019-02-05 PROCEDURE — 80048 BASIC METABOLIC PNL TOTAL CA: CPT | Performed by: PHYSICIAN ASSISTANT

## 2019-02-05 PROCEDURE — 99285 EMERGENCY DEPT VISIT HI MDM: CPT

## 2019-02-05 PROCEDURE — 83735 ASSAY OF MAGNESIUM: CPT | Performed by: PHYSICIAN ASSISTANT

## 2019-02-05 PROCEDURE — 96374 THER/PROPH/DIAG INJ IV PUSH: CPT

## 2019-02-05 PROCEDURE — 36415 COLL VENOUS BLD VENIPUNCTURE: CPT | Performed by: PHYSICIAN ASSISTANT

## 2019-02-05 PROCEDURE — 85025 COMPLETE CBC W/AUTO DIFF WBC: CPT | Performed by: PHYSICIAN ASSISTANT

## 2019-02-05 PROCEDURE — 84484 ASSAY OF TROPONIN QUANT: CPT | Performed by: PHYSICIAN ASSISTANT

## 2019-02-05 PROCEDURE — 71046 X-RAY EXAM CHEST 2 VIEWS: CPT

## 2019-02-05 RX ORDER — PREDNISONE 1 MG/1
5 TABLET ORAL DAILY
Status: DISCONTINUED | OUTPATIENT
Start: 2019-02-06 | End: 2019-02-07 | Stop reason: HOSPADM

## 2019-02-05 RX ORDER — METOPROLOL TARTRATE 50 MG/1
50 TABLET, FILM COATED ORAL EVERY 12 HOURS SCHEDULED
COMMUNITY
End: 2019-01-01

## 2019-02-05 RX ORDER — DONEPEZIL HYDROCHLORIDE 10 MG/1
10 TABLET, FILM COATED ORAL
COMMUNITY
End: 2019-01-01

## 2019-02-05 RX ORDER — ALBUTEROL SULFATE 2.5 MG/3ML
2.5 SOLUTION RESPIRATORY (INHALATION) DAILY
COMMUNITY

## 2019-02-05 RX ORDER — LISINOPRIL 20 MG/1
20 TABLET ORAL DAILY
Status: DISCONTINUED | OUTPATIENT
Start: 2019-02-06 | End: 2019-02-07 | Stop reason: HOSPADM

## 2019-02-05 RX ORDER — AMLODIPINE BESYLATE 10 MG/1
10 TABLET ORAL DAILY
COMMUNITY

## 2019-02-05 RX ORDER — HYDROCHLOROTHIAZIDE 12.5 MG/1
12.5 TABLET ORAL DAILY
COMMUNITY
End: 2019-01-01

## 2019-02-05 RX ORDER — LORAZEPAM 2 MG/ML
0.25 INJECTION INTRAMUSCULAR ONCE
Status: COMPLETED | OUTPATIENT
Start: 2019-02-05 | End: 2019-02-05

## 2019-02-05 RX ORDER — LORAZEPAM 0.5 MG/1
0.5 TABLET ORAL EVERY 8 HOURS PRN
Status: DISCONTINUED | OUTPATIENT
Start: 2019-02-05 | End: 2019-02-07 | Stop reason: HOSPADM

## 2019-02-05 RX ORDER — METOPROLOL TARTRATE 50 MG/1
50 TABLET, FILM COATED ORAL EVERY 12 HOURS SCHEDULED
Status: DISCONTINUED | OUTPATIENT
Start: 2019-02-05 | End: 2019-02-07 | Stop reason: HOSPADM

## 2019-02-05 RX ORDER — IBUPROFEN 200 MG
200 TABLET ORAL ONCE
COMMUNITY
End: 2019-02-07 | Stop reason: HOSPADM

## 2019-02-05 RX ORDER — DONEPEZIL HYDROCHLORIDE 10 MG/1
10 TABLET, FILM COATED ORAL
Status: DISCONTINUED | OUTPATIENT
Start: 2019-02-05 | End: 2019-02-07 | Stop reason: HOSPADM

## 2019-02-05 RX ORDER — PHENOL 1.4 %
600 AEROSOL, SPRAY (ML) MUCOUS MEMBRANE EVERY 6 HOURS PRN
COMMUNITY

## 2019-02-05 RX ORDER — ALBUTEROL SULFATE 2.5 MG/3ML
2.5 SOLUTION RESPIRATORY (INHALATION) EVERY 4 HOURS PRN
Status: DISCONTINUED | OUTPATIENT
Start: 2019-02-05 | End: 2019-02-07 | Stop reason: HOSPADM

## 2019-02-05 RX ORDER — ALBUTEROL SULFATE 2.5 MG/3ML
2.5 SOLUTION RESPIRATORY (INHALATION) DAILY
Status: DISCONTINUED | OUTPATIENT
Start: 2019-02-06 | End: 2019-02-05

## 2019-02-05 RX ORDER — AMLODIPINE BESYLATE 10 MG/1
10 TABLET ORAL DAILY
Status: DISCONTINUED | OUTPATIENT
Start: 2019-02-06 | End: 2019-02-07 | Stop reason: HOSPADM

## 2019-02-05 RX ORDER — PANTOPRAZOLE SODIUM 20 MG/1
20 TABLET, DELAYED RELEASE ORAL
Status: DISCONTINUED | OUTPATIENT
Start: 2019-02-06 | End: 2019-02-07 | Stop reason: HOSPADM

## 2019-02-05 RX ORDER — SODIUM CHLORIDE 9 MG/ML
75 INJECTION, SOLUTION INTRAVENOUS CONTINUOUS
Status: DISCONTINUED | OUTPATIENT
Start: 2019-02-05 | End: 2019-02-07 | Stop reason: HOSPADM

## 2019-02-05 RX ORDER — GABAPENTIN 100 MG/1
100 CAPSULE ORAL 2 TIMES DAILY
Status: DISCONTINUED | OUTPATIENT
Start: 2019-02-05 | End: 2019-02-07 | Stop reason: HOSPADM

## 2019-02-05 RX ORDER — DULOXETIN HYDROCHLORIDE 60 MG/1
60 CAPSULE, DELAYED RELEASE ORAL DAILY
Status: DISCONTINUED | OUTPATIENT
Start: 2019-02-06 | End: 2019-02-07 | Stop reason: HOSPADM

## 2019-02-05 RX ORDER — BENAZEPRIL HYDROCHLORIDE 10 MG/1
10 TABLET ORAL DAILY
COMMUNITY
End: 2019-02-07 | Stop reason: HOSPADM

## 2019-02-05 RX ADMIN — DONEPEZIL HYDROCHLORIDE 10 MG: 10 TABLET, FILM COATED ORAL at 21:36

## 2019-02-05 RX ADMIN — SODIUM CHLORIDE 75 ML/HR: 0.9 INJECTION, SOLUTION INTRAVENOUS at 18:10

## 2019-02-05 RX ADMIN — LORAZEPAM 0.25 MG: 2 INJECTION INTRAMUSCULAR; INTRAVENOUS at 14:05

## 2019-02-05 RX ADMIN — GABAPENTIN 100 MG: 100 CAPSULE ORAL at 18:10

## 2019-02-05 RX ADMIN — METOPROLOL TARTRATE 50 MG: 50 TABLET, FILM COATED ORAL at 21:36

## 2019-02-05 NOTE — SPEECH THERAPY NOTE
Speech Language/Pathology  Spoke w/ Dr Michele Martinez  VBS will be done in am 2/6  Will call radiology in am to confirm the time

## 2019-02-05 NOTE — ED PROVIDER NOTES
History  Chief Complaint   Patient presents with    Choking     pt arrives via EMS from [facility]  per EMS pt had a choking episode at [phone], doctor then switched pt to liquid diet, pt had another episode of choking while eating jello, per EMS staff reported pt was cyanotic and coughing  Pt with a hx of barrettes esophagus    Difficulty Swallowing     This is an 70-year-old female patient lives in a nursing home we are told by EMS that she was eating this morning approximately 1145 and began choking and turned blue  The document changed her diet to clears and supposedly before arrival she had another episode of difficulty breathing and choking where she turned blue  However her and her daughter states she was not choking she just developed shortness of breath without chest pain  Patient is alert oriented nontoxic in no acute distress has no complaints  No fever no chills no headache blurred vision double vision no cough congestion sore throat nausea vomiting diarrhea abdominal pain no chest pain no active shortness of breath  Patient does have a history of anxiety while in the room she appeared he had very anxious and started breathing fast it was similar to her previous attack however I do not want anchor on her anxiety she will have a full cardiac workup with chest x-ray  She denies any further chest pain she is not tachycardic no calf tenderness            Prior to Admission Medications   Prescriptions Last Dose Informant Patient Reported? Taking?    DULoxetine (CYMBALTA) 60 mg delayed release capsule   Yes Yes   Sig: Take 60 mg by mouth daily   LORazepam (ATIVAN) 0 5 mg tablet   Yes Yes   Sig: Take 0 5 mg by mouth every 8 (eight) hours as needed for anxiety   Melatonin 5 MG CAPS   Yes Yes   Sig: Take 5 mg by mouth daily at bedtime   Menthol, Topical Analgesic, (BIOFREEZE) 4 % GEL   Yes Yes   Sig: Apply topically every 12 (twelve) hours   albuterol (2 5 mg/3 mL) 0 083 % nebulizer solution   Yes Yes Sig: Take 2 5 mg by nebulization daily   amLODIPine (NORVASC) 10 mg tablet   Yes Yes   Sig: Take 10 mg by mouth daily   benazepril (LOTENSIN) 10 mg tablet   Yes Yes   Sig: Take 10 mg by mouth daily   benazepril (LOTENSIN) 20 mg tablet   Yes Yes   Sig: Take 20 mg by mouth every 12 (twelve) hours   bisacodyl (FLEET) 10 MG/30ML ENEM   Yes Yes   Sig: Insert 10 mg into the rectum daily as needed for constipation   calcium carbonate (OS-RUPESH) 600 MG tablet   Yes Yes   Sig: Take 600 mg by mouth every 6 (six) hours as needed for heartburn   donepezil (ARICEPT) 10 mg tablet   Yes Yes   Sig: Take 10 mg by mouth daily at bedtime   gabapentin (NEURONTIN) 100 mg capsule   Yes Yes   Sig: Take 100 mg by mouth 2 (two) times a day   hydrochlorothiazide (HYDRODIURIL) 12 5 mg tablet   Yes Yes   Sig: Take 12 5 mg by mouth daily   ibuprofen (MOTRIN) 200 mg tablet   Yes Yes   Sig: Take 200 mg by mouth once   magnesium hydroxide (MILK OF MAGNESIA) 800 MG/5ML suspension   Yes Yes   Sig: Take 30 mL by mouth daily as needed for constipation   metoprolol tartrate (LOPRESSOR) 50 mg tablet   Yes Yes   Sig: Take 50 mg by mouth every 12 (twelve) hours   omeprazole (PriLOSEC) 20 mg delayed release capsule   Yes Yes   Sig: Take 20 mg by mouth daily in the early morning   predniSONE 5 mg tablet   Yes Yes   Sig: Take 5 mg by mouth daily      Facility-Administered Medications: None       Past Medical History:   Diagnosis Date    Ambulatory dysfunction     Toscano's esophagus     Dementia     Diverticulitis     GERD (gastroesophageal reflux disease)        Past Surgical History:   Procedure Laterality Date    HYSTERECTOMY      TOTAL HIP ARTHROPLASTY         History reviewed  No pertinent family history  I have reviewed and agree with the history as documented      Social History   Substance Use Topics    Smoking status: Former Smoker    Smokeless tobacco: Never Used    Alcohol use No        Review of Systems   All other systems reviewed and are negative  Physical Exam  Physical Exam   Constitutional: She appears well-developed and well-nourished  HENT:   Head: Normocephalic and atraumatic  Right Ear: External ear normal    Left Ear: External ear normal    Nose: Nose normal    Mouth/Throat: Oropharynx is clear and moist    Eyes: Pupils are equal, round, and reactive to light  Conjunctivae are normal    Neck: Normal range of motion  Neck supple  Cardiovascular: Normal rate and regular rhythm  Pulmonary/Chest: Effort normal and breath sounds normal    Abdominal: Soft  Bowel sounds are normal  There is no tenderness  Neurological: She is alert  Skin: Skin is warm  Psychiatric: She has a normal mood and affect  Her behavior is normal    Nursing note and vitals reviewed        Vital Signs  ED Triage Vitals [02/05/19 1338]   Temperature Pulse Respirations Blood Pressure SpO2   97 9 °F (36 6 °C) 69 20 113/55 92 %      Temp Source Heart Rate Source Patient Position - Orthostatic VS BP Location FiO2 (%)   Oral Monitor Sitting Left arm --      Pain Score       No Pain           Vitals:    02/05/19 1338 02/05/19 1431   BP: 113/55 128/62   Pulse: 69 76   Patient Position - Orthostatic VS: Sitting Lying       Visual Acuity      ED Medications  Medications   LORazepam (ATIVAN) 2 mg/mL injection 0 25 mg (0 25 mg Intravenous Given 2/5/19 1405)       Diagnostic Studies  Results Reviewed     Procedure Component Value Units Date/Time    Troponin I [[id number]]  (Normal) Collected:  02/05/19 1401    Lab Status:  Final result Specimen:  Blood from Arm, Right Updated:  02/05/19 1429     Troponin I 0 04 ng/mL     Basic metabolic panel [[id number]]  (Abnormal) Collected:  02/05/19 1401    Lab Status:  Final result Specimen:  Blood from Arm, Right Updated:  02/05/19 1426     Sodium 138 mmol/L      Potassium 3 6 mmol/L      Chloride 100 mmol/L      CO2 28 mmol/L      ANION GAP 10 mmol/L      BUN 16 mg/dL      Creatinine 0 97 mg/dL      Glucose 147 (H) mg/dL Calcium 9 2 mg/dL      eGFR 52 ml/min/1 73sq m     Narrative:         National Kidney Disease Education Program recommendations are as follows:  GFR calculation is accurate only with a steady state creatinine  Chronic Kidney disease less than 60 ml/min/1 73 sq  meters  Kidney failure less than 15 ml/min/1 73 sq  meters  Magnesium [[id number]]  (Normal) Collected:  02/05/19 1401    Lab Status:  Final result Specimen:  Blood from Arm, Right Updated:  02/05/19 1426     Magnesium 1 9 mg/dL     CBC and differential [[id number]]  (Abnormal) Collected:  02/05/19 1401    Lab Status:  Final result Specimen:  Blood from Arm, Right Updated:  02/05/19 1408     WBC 11 85 (H) Thousand/uL      RBC 4 15 Million/uL      Hemoglobin 12 1 g/dL      Hematocrit 37 9 %      MCV 91 fL      MCH 29 2 pg      MCHC 31 9 g/dL      RDW 13 9 %      MPV 9 4 fL      Platelets 991 Thousands/uL      nRBC 0 /100 WBCs      Neutrophils Relative 84 (H) %      Immat GRANS % 1 %      Lymphocytes Relative 5 (L) %      Monocytes Relative 6 %      Eosinophils Relative 3 %      Basophils Relative 1 %      Neutrophils Absolute 10 08 (H) Thousands/µL      Immature Grans Absolute 0 07 Thousand/uL      Lymphocytes Absolute 0 63 Thousands/µL      Monocytes Absolute 0 69 Thousand/µL      Eosinophils Absolute 0 30 Thousand/µL      Basophils Absolute 0 08 Thousands/µL                  XR chest 2 views   Final Result by Kriss Avelar DO (02/05 1533)      No consolidation or pneumothorax  Degenerative changes left shoulder  Workstation performed: [id number]         XR chest portable   Final Result by Kriss Avelar DO (02/05 1454)      Patchy left basilar airspace opacity potentially atelectasis or developing pneumonia  Prominence of the right hilum  Consider PA and lateral views are further evaluation                    Workstation performed: [id number]                    Procedures  ECG 12 Lead Documentation  Date/Time: 2/5/2019 3:39 PM  Performed by: Nancy Casas  Authorized by: Nancy Casas     Comments:      Initial EKG interpreted by me 70 states 2 beats per minute sinus rhythm no ST elevation no ectopics QTC less than 500 no previous           Phone Contacts  ED Phone Contact    ED Course         HEART Risk Score      Most Recent Value   History  1 Filed at: 02/05/2019 1539   ECG  0 Filed at: 02/05/2019 1539   Age  2 Filed at: 02/05/2019 1539   Risk Factors  2 Filed at: 02/05/2019 1539   Troponin  0 Filed at: 02/05/2019 1539   Heart Score Risk Calculator   History  1 Filed at: 02/05/2019 1539   ECG  0 Filed at: 02/05/2019 1539   Age  2 Filed at: 02/05/2019 1539   Risk Factors  2 Filed at: 02/05/2019 1539   Troponin  0 Filed at: 02/05/2019 1539   HEART Score  5 Filed at: 02/05/2019 1539   HEART Score  5 Filed at: 02/05/2019 1539                            MDM  Number of Diagnoses or Management Options  Cyanosis:   Respiratory distress:   Diagnosis management comments: This is an 41-year-old female patient who had a 1st choking episode turn cyanotic and difficulty breathing then later the afternoon she has had a shortness of breath episode turned cyanotic my concern was for aspiration at this time she has no respiratory distress all her labs and x-rays to come back normal   Patient be admitted for shortness of breath and cyanosis to be monitored for respiration    Spoke with Dr Kari Rome who agreed      Disposition  Final diagnoses:   Respiratory distress   Cyanosis     Time reflects when diagnosis was documented in both MDM as applicable and the Disposition within this note     Time User Action Codes Description Comment    2/5/2019  3:38 PM Urvashi Williamson Add [R06 03] Respiratory distress     2/5/2019  3:38 PM [place], [address] [facility] Add [R23 0] Cyanosis       ED Disposition     ED Disposition Condition Date/Time Comment    Admit  Tue Feb 5, 2019  3:40 PM Case was discussed with Dr Kari Rome and the patient's admission status was agreed to be Admission Status: inpatient status to the service of Dr Dr Tirso Lopez   Follow-up Information    None         Patient's Medications   Discharge Prescriptions    No medications on file     No discharge procedures on file      ED Provider  Electronically Signed by           Lloyd Pillai PA-C  02/05/19 350 ANDREEA Noriega  02/05/19 2877

## 2019-02-05 NOTE — H&P
Unit/Bed#: [facility] 2 -02 Encounter: [id number]    Chief complaint:  Choking with cyanosis    History of Present Illness     HPI:  Destiny Peter is a 80 y o  female who presents to the emergency room after suffering 2 episodes of cyanosis  When the patient was eating breakfast she began to cough  She turned blue  Fortunately, she was able to clear her airway and she improved  At that point, she was placed on a clear liquid diet  For lunch she was eating Jell-O and again had a coughing episode and turned blue  At that point, she was transferred to the emergency room for further evaluation  The patient states that she frequently coughs when she eats  When seen in the emergency room she was feeling completely well  She denied any chest pain, shortness of breath, ongoing cough, sputum production, etc   The patient is admitted for further evaluation and care  The patient's past medical history is positive for hypertension  She has a history of of gastroesophageal reflux with Toscano's esophagus  She has osteoarthritis  She has a history of dementia and depression  She denies any history of diabetes, heart disease, COPD, peptic ulcer disease, or kidney disease  The patient's medications at the time of admission include: Albuterol by nebulization 4 times daily as needed  Amlodipine 10 mg daily  Benazepril 20 mg twice daily  Calcium carbonate 600 mg as needed for heartburn  Donepezil 10 mg daily  Cymbalta 60 mg daily  Gabapentin 100 mg twice daily  Hydrochlorothiazide 12 5 mg daily  Ibuprofen 200 mg as needed  Lorazepam 0 5 mg every 8 hours as needed  Milk a magnesia 30 mL daily as needed  Melatonin 5 mg at bedtime  Metoprolol 50 mg twice daily  Omeprazole 20 mg daily  Prednisone 5 mg daily    The patient is allergic to penicillin and Tylenol    Family history is noncontributory    Social history reveals that the patient is a   She lives at [facility] brought    She does not smoke, drink, or use illicit drugs  Review of Systems  A detailed 12 point review of systems was undertaken  In addition to those issues mentioned above, the patient has difficulties with her memory which she readily acknowledges  She also has significant joint pain at times  Objective   Vitals: Blood pressure (!) 100/43, pulse 75, temperature 98 6 °F (37 °C), temperature source Temporal, resp  rate 20, SpO2 98 %  Physical Exam   The patient is a well-developed woman who appears in no distress  Head is atraumatic and normocephalic  ENT examination reveals that she wears dentures but is otherwise normal   Eye examination reveals the pupils to be equal, round, and reactive to light  Extraocular movements are intact  Neck is supple  Carotids are full without bruits  There is no lymphadenopathy or goiter  The voice is without hoarseness  There is no stridor  Lungs are clear to auscultation and percussion  There is no wheezing, rales, or rhonchi  Cardiac exam reveals a regular rhythm  I heard no murmur, gallop, or rub  The abdomen is soft with active bowel sounds  There is no mass, tenderness, or organomegaly  Extremities showed no clubbing, cyanosis, or edema  There was no calf tenderness  Neurologic examination reveals the patient to be alert and conversant  No focal sign was noted      Lab Results:   Results for orders placed or performed during the hospital encounter of 02/05/19   CBC and differential   Result Value Ref Range    WBC 11 85 (H) 4 31 - 10 16 Thousand/uL    RBC 4 15 3 81 - 5 12 Million/uL    Hemoglobin 12 1 11 5 - 15 4 g/dL    Hematocrit 37 9 34 8 - 46 1 %    MCV 91 82 - 98 fL    MCH 29 2 26 8 - 34 3 pg    MCHC 31 9 31 4 - 37 4 g/dL    RDW 13 9 11 6 - 15 1 %    MPV 9 4 8 9 - 12 7 fL    Platelets 306 338 - 330 Thousands/uL    nRBC 0 /100 WBCs    Neutrophils Relative 84 (H) 43 - 75 %    Immat GRANS % 1 0 - 2 %    Lymphocytes Relative 5 (L) 14 - 44 %    Monocytes Relative 6 4 - 12 % Eosinophils Relative 3 0 - 6 %    Basophils Relative 1 0 - 1 %    Neutrophils Absolute 10 08 (H) 1 85 - 7 62 Thousands/µL    Immature Grans Absolute 0 07 0 00 - 0 20 Thousand/uL    Lymphocytes Absolute 0 63 0 60 - 4 47 Thousands/µL    Monocytes Absolute 0 69 0 17 - 1 22 Thousand/µL    Eosinophils Absolute 0 30 0 00 - 0 61 Thousand/µL    Basophils Absolute 0 08 0 00 - 0 10 Thousands/µL   Basic metabolic panel   Result Value Ref Range    Sodium 138 136 - 145 mmol/L    Potassium 3 6 3 5 - 5 3 mmol/L    Chloride 100 100 - 108 mmol/L    CO2 28 21 - 32 mmol/L    ANION GAP 10 4 - 13 mmol/L    BUN 16 5 - 25 mg/dL    Creatinine 0 97 0 60 - 1 30 mg/dL    Glucose 147 (H) 65 - 140 mg/dL    Calcium 9 2 8 3 - 10 1 mg/dL    eGFR 52 ml/min/1 73sq m   Troponin I   Result Value Ref Range    Troponin I 0 04 <=0 04 ng/mL   Magnesium   Result Value Ref Range    Magnesium 1 9 1 6 - 2 6 mg/dL     Imaging:  Chest x-ray showed no significant abnormalities except for degenerative changes of the left shoulder  Assessment/Plan     Assessment:  1  Aspiration with acute hypoxia  2  Hypertension  3  Dementia  4  History of depression  5  Gastroesophageal reflux and Toscano's esophagus  6  Osteoarthritis  7  Dysphagia     Plan:  The patient will be admitted to the hospital and kept NPO except for medications  She will be gently hydrated not until she is able to resume oral intake  Fortunately, despite 2 significant episodes of aspiration, the patient does not appear to have pneumonia or other respiratory complication  I have discussed situation with speech therapy  The patient will be evaluated tomorrow and a video swallow will be obtained  Further intervention will depend on the result of this study  Considering the above information, I expect that the patient will be hospitalized for 2 or more midnights  She has therefore been assigned to inpatient status  I discussed resuscitative measures with the patient    She seemed to be able to grasp the issues involved quite clearly    She requested that all available modalities be employed on her behalf in the event of a cardiac arrest   She is therefore sign to code blue level 1        Code Status: Level 1 - Full Code

## 2019-02-05 NOTE — PLAN OF CARE
INFECTION - ADULT     Absence or prevention of progression during hospitalization Progressing     Absence of fever/infection during neutropenic period Progressing        PAIN - ADULT     Verbalizes/displays adequate comfort level or baseline comfort level Progressing        Potential for Falls     Patient will remain free of falls Progressing        RESPIRATORY - ADULT     Achieves optimal ventilation and oxygenation Progressing        SAFETY ADULT     Maintain or return to baseline ADL function Progressing     Maintain or return mobility status to optimal level Progressing

## 2019-02-06 ENCOUNTER — APPOINTMENT (INPATIENT)
Dept: RADIOLOGY | Facility: HOSPITAL | Age: 84
DRG: 206 | End: 2019-02-06
Payer: MEDICARE

## 2019-02-06 LAB
ANION GAP SERPL CALCULATED.3IONS-SCNC: 10 MMOL/L (ref 4–13)
BASOPHILS # BLD AUTO: 0.06 THOUSANDS/ΜL (ref 0–0.1)
BASOPHILS NFR BLD AUTO: 1 % (ref 0–1)
BUN SERPL-MCNC: 11 MG/DL (ref 5–25)
CALCIUM SERPL-MCNC: 9.2 MG/DL (ref 8.3–10.1)
CHLORIDE SERPL-SCNC: 103 MMOL/L (ref 100–108)
CO2 SERPL-SCNC: 27 MMOL/L (ref 21–32)
CREAT SERPL-MCNC: 0.67 MG/DL (ref 0.6–1.3)
EOSINOPHIL # BLD AUTO: 0.6 THOUSAND/ΜL (ref 0–0.61)
EOSINOPHIL NFR BLD AUTO: 8 % (ref 0–6)
ERYTHROCYTE [DISTWIDTH] IN BLOOD BY AUTOMATED COUNT: 14 % (ref 11.6–15.1)
GFR SERPL CREATININE-BSD FRML MDRD: 79 ML/MIN/1.73SQ M
GLUCOSE SERPL-MCNC: 90 MG/DL (ref 65–140)
HCT VFR BLD AUTO: 38.9 % (ref 34.8–46.1)
HGB BLD-MCNC: 12.6 G/DL (ref 11.5–15.4)
IMM GRANULOCYTES # BLD AUTO: 0.04 THOUSAND/UL (ref 0–0.2)
IMM GRANULOCYTES NFR BLD AUTO: 1 % (ref 0–2)
LYMPHOCYTES # BLD AUTO: 0.74 THOUSANDS/ΜL (ref 0.6–4.47)
LYMPHOCYTES NFR BLD AUTO: 9 % (ref 14–44)
MCH RBC QN AUTO: 29.6 PG (ref 26.8–34.3)
MCHC RBC AUTO-ENTMCNC: 32.4 G/DL (ref 31.4–37.4)
MCV RBC AUTO: 91 FL (ref 82–98)
MONOCYTES # BLD AUTO: 0.57 THOUSAND/ΜL (ref 0.17–1.22)
MONOCYTES NFR BLD AUTO: 7 % (ref 4–12)
NEUTROPHILS # BLD AUTO: 5.99 THOUSANDS/ΜL (ref 1.85–7.62)
NEUTS SEG NFR BLD AUTO: 74 % (ref 43–75)
NRBC BLD AUTO-RTO: 0 /100 WBCS
PLATELET # BLD AUTO: 179 THOUSANDS/UL (ref 149–390)
PMV BLD AUTO: 10.6 FL (ref 8.9–12.7)
POTASSIUM SERPL-SCNC: 3.5 MMOL/L (ref 3.5–5.3)
RBC # BLD AUTO: 4.26 MILLION/UL (ref 3.81–5.12)
SODIUM SERPL-SCNC: 140 MMOL/L (ref 136–145)
WBC # BLD AUTO: 8 THOUSAND/UL (ref 4.31–10.16)

## 2019-02-06 PROCEDURE — 92611 MOTION FLUOROSCOPY/SWALLOW: CPT

## 2019-02-06 PROCEDURE — G8996 SWALLOW CURRENT STATUS: HCPCS

## 2019-02-06 PROCEDURE — 85025 COMPLETE CBC W/AUTO DIFF WBC: CPT | Performed by: INTERNAL MEDICINE

## 2019-02-06 PROCEDURE — 99232 SBSQ HOSP IP/OBS MODERATE 35: CPT | Performed by: INTERNAL MEDICINE

## 2019-02-06 PROCEDURE — G8997 SWALLOW GOAL STATUS: HCPCS

## 2019-02-06 PROCEDURE — 74230 X-RAY XM SWLNG FUNCJ C+: CPT

## 2019-02-06 PROCEDURE — 80048 BASIC METABOLIC PNL TOTAL CA: CPT | Performed by: INTERNAL MEDICINE

## 2019-02-06 RX ADMIN — AMLODIPINE BESYLATE 10 MG: 10 TABLET ORAL at 11:02

## 2019-02-06 RX ADMIN — DULOXETINE HYDROCHLORIDE 60 MG: 60 CAPSULE, DELAYED RELEASE ORAL at 11:02

## 2019-02-06 RX ADMIN — SODIUM CHLORIDE 75 ML/HR: 0.9 INJECTION, SOLUTION INTRAVENOUS at 06:40

## 2019-02-06 RX ADMIN — LISINOPRIL 20 MG: 20 TABLET ORAL at 11:01

## 2019-02-06 RX ADMIN — PANTOPRAZOLE SODIUM 20 MG: 20 TABLET, DELAYED RELEASE ORAL at 06:40

## 2019-02-06 RX ADMIN — ENOXAPARIN SODIUM 40 MG: 40 INJECTION SUBCUTANEOUS at 11:02

## 2019-02-06 RX ADMIN — METOPROLOL TARTRATE 50 MG: 50 TABLET, FILM COATED ORAL at 22:00

## 2019-02-06 RX ADMIN — PREDNISONE 5 MG: 5 TABLET ORAL at 11:02

## 2019-02-06 RX ADMIN — DONEPEZIL HYDROCHLORIDE 10 MG: 10 TABLET, FILM COATED ORAL at 22:00

## 2019-02-06 RX ADMIN — METOPROLOL TARTRATE 50 MG: 50 TABLET, FILM COATED ORAL at 11:01

## 2019-02-06 RX ADMIN — GABAPENTIN 100 MG: 100 CAPSULE ORAL at 11:02

## 2019-02-06 RX ADMIN — GABAPENTIN 100 MG: 100 CAPSULE ORAL at 17:14

## 2019-02-06 NOTE — PROCEDURES
Video Swallow Study      Patient Name: Mary Tellez  YHGZV'V Date: 2/6/2019        Past Medical History  Past Medical History:   Diagnosis Date    Ambulatory dysfunction     Toscano's esophagus     Dementia     Diverticulitis     GERD (gastroesophageal reflux disease)         Past Surgical History  Past Surgical History:   Procedure Laterality Date    HYSTERECTOMY      TOTAL HIP ARTHROPLASTY         History of Present Illness      Expand All Collapse All    []Hide copied text  []Hover for attribution information  History       Chief Complaint   Patient presents with    Choking       pt arrives via EMS from [facility]  per EMS pt had a choking episode at [phone], doctor then switched pt to liquid diet, pt had another episode of choking while eating jello, per EMS staff reported pt was cyanotic and coughing  Pt with a hx of barrettes esophagus    Difficulty Swallowing      This is an 80-year-old female patient lives in a nursing home we are told by EMS that she was eating this morning approximately 1145 and began choking and turned blue  The document changed her diet to clears and supposedly before arrival she had another episode of difficulty breathing and choking where she turned blue  However her and her daughter states she was not choking she just developed shortness of breath without chest pain  Patient is alert oriented nontoxic in no acute distress has no complaints  No fever no chills no headache blurred vision double vision no cough congestion sore throat nausea vomiting diarrhea abdominal pain no chest pain no active shortness of breath  Patient does have a history of anxiety while in the room she appeared he had very anxious and started breathing fast it was similar to her previous attack however I do not want anchor on her anxiety she will have a full cardiac workup with chest x-ray    She denies any further chest pain she is not tachycardic no calf tenderness        HPI:  Adeline Gates is a 80 y o  female who presents to the emergency room after suffering 2 episodes of cyanosis  When the patient was eating breakfast she began to cough  She turned blue  Fortunately, she was able to clear her airway and she improved  At that point, she was placed on a clear liquid diet  For lunch she was eating Jell-O and again had a coughing episode and turned blue  At that point, she was transferred to the emergency room for further evaluation  The patient states that she frequently coughs when she eats  When seen in the emergency room she was feeling completely well  She denied any chest pain, shortness of breath, ongoing cough, sputum production, etc   The patient is admitted for further evaluation and care  The patient's past medical history is positive for hypertension  She has a history of of gastroesophageal reflux with Toscano's esophagus  She has osteoarthritis  She has a history of dementia and depression  She denies any history of diabetes, heart disease, COPD, peptic ulcer disease, or kidney disease  Lungs were clear on 2/5/19 cxr  Video Barium Swallow Study    Summary: Pt presented w/ prolonged mastication, otherwise oral and pharyngeal stages were WNL this study  Images are on PACS for review  See details below  Recommendations:  Diet: Dysphagia 3/soft to chew for now  Avoid dry/dense foods that are hard to chew  Liquids: Thin  Meds: pt reports large pill dysphagia  break or crush if large  Strategies: refrain from speaking while chewing/swallow  Avoid neck extension  (tended to tilt head back slightly during VBS)  Limit distractions  Upright position  F/u ST tx: yes  Therapy Prognosis: favorable  Prognosis considerations: good results on VBS today  Full Supervision for the first few meals to ensure tolerance    Aspiration Precautions  Reflux Precautions  Results reviewed with: pt, nursing, physician  Aspiration precautions posted  Patient's goal:  None stated at this time    Goals:  Pt will tolerate least restrictive diet w/out s/s aspiration or oral/pharyngeal difficulties  Pt will refrain from speakin gw/ food and liquid in her mouth w/ min cues to reduce aspiration risk  Pt will avoid neck extension when eating and drinking w/ min cues to reduce aspiration risk  Pt is an 88yof from [place] [address] referred for VBS due to choking episodes  Previous VBS:  None known  Current Diet:  npo until vbs  Premorbid diet:  Regular, then changed to clears after episode, then made npo p another episode  Dentition:  Partial plate, full plate  O2 requirement:  none  Oral mech:  Strength and ROM:  wnl  Vocal Quality/Speech:  wnl  Cognitive status:  Poor stm    Consistencies administered: Barium laden applesauce,cheerios/nectar, hard granola bar, hard cookie, nectar thick, thin liquids,  1/2 13mm barium pill  Liquids were administered by tsp, cup and straw  Pt was seated laterally at 90 degrees  Oral stage:  WNL/WFL  Lip closure:wnl  Mastication: prolonged but functional  Bolus formation:wnl  Bolus control:wnl  Transfer:wnl  Residue:none    Pharyngeal stage: WNL  Swallow promptness:prompt  Epiglottic inversion: incomplete/WFL  Laryngeal rise:wnl  Pharyngeal constriction:wnl  Vallecular retention:none  Pyriform retention:none  PPW coating:none  Transient penetration:none  Epiglottic undercoat:none  Penetration:none  Aspiration:none    Screening of Esophageal stage:  Mild distal stasis and retropulsion at completion of study

## 2019-02-06 NOTE — PLAN OF CARE
Problem: DISCHARGE PLANNING - CARE MANAGEMENT  Goal: Discharge to post-acute care or home with appropriate resources  INTERVENTIONS:  - Conduct assessment to determine patient/family and health care team treatment goals, and need for post-acute services based on payer coverage, community resources, and patient preferences, and barriers to discharge  - Address psychosocial, clinical, and financial barriers to discharge as identified in assessment in conjunction with the patient/family and health care team  - Arrange appropriate level of post-acute services according to patients   needs and preference and payer coverage in collaboration with the physician and health care team  - Communicate with and update the patient/family, physician, and health care team regarding progress on the discharge plan  - Arrange appropriate transportation to post-acute venues  Outcome: Progressing  Cm to follow throughout hospitalization to assist with d/c planning  CM to assist with transportation back to SNF when medically cleared

## 2019-02-06 NOTE — PROGRESS NOTES
Progress Note - Jewels Sethi 80 y o  female MRN: [id number]    Unit/Bed#: [facility] 2 -02 Encounter: [id number]      Subjective: The patient feels pretty well  She had no problems overnight  She denies shortness of breath, chest pain, nausea, or vomiting  Her video swallow study went well  Her diet has been increased a in collaboration with speech therapy  Physical Exam:   Temp:  [98 1 °F (36 7 °C)-98 9 °F (37 2 °C)] 98 9 °F (37 2 °C)  HR:  [65-78] 70  Resp:  [16-20] 18  BP: (100-135)/(43-73) 128/66    Gen:  Well-developed, well-nourished, in no distress  Neck:  Supple  No lymphadenopathy, goiter, or bruit  Heart:  Regular rhythm  I heard no murmur, gallop, or rub  Lungs:  Clear to auscultation and percussion  I heard no wheezing, rales, or rhonchi   Abd:  Soft with active bowel sounds  No mass, tenderness, or organomegaly  Extremities:  No clubbing, cyanosis, or edema  No calf tenderness  Neuro:  Alert and conversant  No focal sign  Skin:  Warm and dry      LABS:   CBC:   Lab Results   Component Value Date    WBC 8 00 02/06/2019    HGB 12 6 02/06/2019    HCT 38 9 02/06/2019    MCV 91 02/06/2019     02/06/2019    MCH 29 6 02/06/2019    MCHC 32 4 02/06/2019    RDW 14 0 02/06/2019    MPV 10 6 02/06/2019    NRBC 0 02/06/2019   , CMP:   Lab Results   Component Value Date    SODIUM 140 02/06/2019    K 3 5 02/06/2019     02/06/2019    CO2 27 02/06/2019    BUN 11 02/06/2019    CREATININE 0 67 02/06/2019    CALCIUM 9 2 02/06/2019    EGFR 79 02/06/2019           Patient Active Problem List   Diagnosis    Choking episode occurring during daytime    Depression    Essential hypertension    Temporal arteritis (HCC)    Dementia    Toscano's esophagus    GERD (gastroesophageal reflux disease)    Respiratory distress    Cyanosis       Assessment/Plan:  1  Pulmonary aspiration with transient cyanosis  2  Hypertension  3  Esophageal reflux and Toscano's esophagus  4  Temporal arteritis  5  Dementia    The patient has shown no evidence of pulmonary infection related to her aspiration episodes of yesterday  Her video swallow was good  Her diet has been increased with some precautions as elaborated by speech therapy  If the patient does well overnight on her current diet, I believe that discharge tomorrow will be possible        VTE Pharmacologic Prophylaxis: Enoxaparin (Lovenox)  VTE Mechanical Prophylaxis: sequential compression device

## 2019-02-06 NOTE — PHYSICIAN ADVISOR
Current patient class: Inpatient  The patient is currently on Hospital Day: 2 at [facility]      The patient was admitted to the hospital at [phone] on 2/5/19 for the following diagnosis:  Cyanosis [R23 0]  Respiratory distress [R06 03]  Choking [T17 308A]  Difficulty swallowing [R13 10]       There is documentation in the medical record of an expected length of stay of at least 2 midnights  The patient is therefore expected to satisfy the 2 midnight benchmark and given the 2 midnight presumption is appropriate for INPATIENT ADMISSION  Given this expectation of a satisfying stay, CMS instructs us that the patient is most often appropriate for inpatient admission under part A provided medical necessity is documented in the chart  After review of the relevant documentation, labs, vital signs and test results, the patient is appropriate for INPATIENT ADMISSION  Admission to the hospital as an inpatient is a complex decision making process which requires the practitioner to consider the patients presenting complaint, history and physical examination and all relevant testing  With this in mind, in this case, the patient was deemed appropriate for INPATIENT ADMISSION  After review of the documentation and testing available at the time of the admission I concur with this clinical determination of medical necessity  Rationale is as follows: The patient is an 20-year-old female who presented to the emergency room from a nursing home on 02/05/2019 with a chief complaint of 2 separate choking episodes occurring during meals  Patient initially had a choking episode and was switched to a liquid diet  She subsequently was cyanotic and coughing while eating Jell-O  In the emergency room patient was found to be hypoxic with oxygen saturation 92% on room air and tachypneic  Oxygen was supplemented via nasal cannula  She had mild leukocytosis    Patient was started on intravenous fluids and speech/swallowing specialist was consulted  Patient had a video barium swallow study  Recommendations are full supervision of the 1st few meals to in shoulder tolerance diet is being advanced  Patient for now is continued on intravenous fluids  As of today patient had oxygen saturation levels ranging between 93 and 95% on room air  Continued inpatient hospitalization is warranted for this patient  She is expected to remain hospitalized for over 2 midnights      The patients vitals on arrival were ED Triage Vitals [02/05/19 1338]   Temperature Pulse Respirations Blood Pressure SpO2   97 9 °F (36 6 °C) 69 20 113/55 92 %      Temp Source Heart Rate Source Patient Position - Orthostatic VS BP Location FiO2 (%)   Oral Monitor Sitting Left arm --      Pain Score       No Pain           Past Medical History:   Diagnosis Date    Ambulatory dysfunction     Toscano's esophagus     Dementia     Diverticulitis     GERD (gastroesophageal reflux disease)      Past Surgical History:   Procedure Laterality Date    HYSTERECTOMY      TOTAL HIP ARTHROPLASTY             Consults have been placed to:   None    Vitals:    02/05/19 2136 02/05/19 2320 02/06/19 0740 02/06/19 1525   BP: 135/73 118/59 128/66 (!) 109/43   BP Location:  Left arm Left arm Left arm   Pulse: 78 68 70 69   Resp:  20 18 18   Temp:  98 1 °F (36 7 °C) 98 9 °F (37 2 °C) 97 6 °F (36 4 °C)   TempSrc:  Temporal Tympanic Temporal   SpO2:  93% 95% 94%       Most recent labs:    Recent Labs      02/05/19   1401  02/06/19   0500   WBC  11 85*  8 00   HGB  12 1  12 6   HCT  37 9  38 9   PLT  199  179   K  3 6  3 5   CALCIUM  9 2  9 2   BUN  16  11   CREATININE  0 97  0 67   TROPONINI  0 04   --        Scheduled Meds:  Current Facility-Administered Medications:  albuterol 2 5 mg Nebulization Q4H PRN Alee Guzman MD    amLODIPine 10 mg Oral Daily Alee Guzman MD    donepezil 10 mg Oral HS Alee Guzman MD    DULoxetine 60 mg Oral Daily Alee Guzman MD enoxaparin 40 mg Subcutaneous Daily Lm Monique MD    gabapentin 100 mg Oral BID Lm Monique MD    lisinopril 20 mg Oral Daily Lm Monique MD    LORazepam 0 5 mg Oral Q8H PRN Lm Monique MD    metoprolol tartrate 50 mg Oral Q12H [address] Lm Monique MD    pantoprazole 20 mg Oral Early Morning Lm Monique MD    predniSONE 5 mg Oral Daily Lm Monique MD    sodium chloride 75 mL/hr Intravenous Continuous Lm Monique MD Last Rate: 75 mL/hr (02/06/19 0640)     Continuous Infusions:  sodium chloride 75 mL/hr Last Rate: 75 mL/hr (02/06/19 0640)     PRN Meds:   albuterol    LORazepam    Surgical procedures (if appropriate):

## 2019-02-06 NOTE — DISCHARGE INSTR - DIET
Video Barium Swallow Study 2/6/19     Summary: Pt presented w/ prolonged mastication, otherwise oral and pharyngeal stages were WNL this study  Images are on PACS for review  See details below       Recommendations:  Diet: Dysphagia 3/soft to chew for now  Avoid dry/dense foods that are hard to chew  Liquids: Thin  Meds: pt reports large pill dysphagia  break or crush if large  Strategies: refrain from speaking while chewing/swallow  Avoid neck extension  (tended to tilt head back slightly during VBS)  Limit distractions  Upright position  F/u ST tx: yes  Therapy Prognosis: favorable  Prognosis considerations: good results on VBS today  Full Supervision for the first few meals to ensure tolerance  Aspiration Precautions  Reflux Precautions  Results reviewed with: pt, nursing, physician  Aspiration precautions posted      Patient's goal:  None stated at this time     Goals:  Pt will tolerate least restrictive diet w/out s/s aspiration or oral/pharyngeal difficulties  Pt will refrain from speakin gw/ food and liquid in her mouth w/ min cues to reduce aspiration risk  Pt will avoid neck extension when eating and drinking w/ min cues to reduce aspiration risk          Pt is an 88yof from [facility] referred for VBS due to choking episodes       Previous VBS:  None known  Current Diet:  npo until vbs  Premorbid diet:  Regular, then changed to clears after episode, then made npo p another episode  Dentition:  Partial plate, full plate  O2 requirement:  none  Oral mech:  Strength and ROM:  wnl  Vocal Quality/Speech:  wnl  Cognitive status:  Poor stm     Consistencies administered: Barium laden applesauce,cheerios/nectar, hard granola bar, hard cookie, nectar thick, thin liquids,  1/2 13mm barium pill   Liquids were administered by tsp, cup and straw       Pt was seated laterally at 90 degrees       Oral stage:  WNL/WFL  Lip closure:wnl  Mastication: prolonged but functional  Bolus formation:wnl  Bolus control:wnl  Transfer:wnl  Residue:none     Pharyngeal stage: WNL  Swallow promptness:prompt  Epiglottic inversion: incomplete/WFL  Laryngeal rise:wnl  Pharyngeal constriction:wnl  Vallecular retention:none  Pyriform retention:none  PPW coating:none  Transient penetration:none  Epiglottic undercoat:none  Penetration:none  Aspiration:none     Screening of Esophageal stage:  Mild distal stasis and retropulsion at completion of study

## 2019-02-07 VITALS
OXYGEN SATURATION: 93 % | HEART RATE: 67 BPM | RESPIRATION RATE: 18 BRPM | TEMPERATURE: 97.7 F | SYSTOLIC BLOOD PRESSURE: 129 MMHG | DIASTOLIC BLOOD PRESSURE: 56 MMHG

## 2019-02-07 PROCEDURE — 92526 ORAL FUNCTION THERAPY: CPT

## 2019-02-07 PROCEDURE — 99239 HOSP IP/OBS DSCHRG MGMT >30: CPT | Performed by: INTERNAL MEDICINE

## 2019-02-07 RX ADMIN — LISINOPRIL 20 MG: 20 TABLET ORAL at 08:18

## 2019-02-07 RX ADMIN — PREDNISONE 5 MG: 5 TABLET ORAL at 08:17

## 2019-02-07 RX ADMIN — METOPROLOL TARTRATE 50 MG: 50 TABLET, FILM COATED ORAL at 08:17

## 2019-02-07 RX ADMIN — AMLODIPINE BESYLATE 10 MG: 10 TABLET ORAL at 08:17

## 2019-02-07 RX ADMIN — DULOXETINE HYDROCHLORIDE 60 MG: 60 CAPSULE, DELAYED RELEASE ORAL at 08:18

## 2019-02-07 RX ADMIN — PANTOPRAZOLE SODIUM 20 MG: 20 TABLET, DELAYED RELEASE ORAL at 06:17

## 2019-02-07 RX ADMIN — ENOXAPARIN SODIUM 40 MG: 40 INJECTION SUBCUTANEOUS at 08:18

## 2019-02-07 RX ADMIN — GABAPENTIN 100 MG: 100 CAPSULE ORAL at 08:18

## 2019-02-07 NOTE — PLAN OF CARE
DISCHARGE PLANNING     Discharge to home or other facility with appropriate resources Adequate for Discharge        INFECTION - ADULT     Absence or prevention of progression during hospitalization Adequate for Discharge     Absence of fever/infection during neutropenic period Adequate for Discharge        Knowledge Deficit     Patient/family/caregiver demonstrates understanding of disease process, treatment plan, medications, and discharge instructions Adequate for Discharge        Nutrition/Hydration-ADULT     Nutrient/Hydration intake appropriate for improving, restoring or maintaining nutritional needs Adequate for Discharge        PAIN - ADULT     Verbalizes/displays adequate comfort level or baseline comfort level Adequate for Discharge        Potential for Falls     Patient will remain free of falls Adequate for Discharge        RESPIRATORY - ADULT     Achieves optimal ventilation and oxygenation Adequate for Discharge        SAFETY ADULT     Maintain or return to baseline ADL function Adequate for Discharge     Maintain or return mobility status to optimal level Adequate for Discharge

## 2019-02-07 NOTE — SPEECH THERAPY NOTE
Speech Language/Pathology    Speech/Language Pathology Progress Note    Patient Name: Luther Ordoñez  VTRYY'V Date: 2/7/2019     Problem List  Patient Active Problem List   Diagnosis    Choking episode occurring during daytime    Depression    Essential hypertension    Temporal arteritis ([facility] [address] )    Dementia    Toscano's esophagus    GERD (gastroesophageal reflux disease)    Respiratory distress    Cyanosis        Past Medical History  Past Medical History:   Diagnosis Date    Ambulatory dysfunction     Toscano's esophagus     Dementia     Diverticulitis     GERD (gastroesophageal reflux disease)         Past Surgical History  Past Surgical History:   Procedure Laterality Date    HYSTERECTOMY      TOTAL HIP ARTHROPLASTY           Subjective:  Pt alert and pleasant  She had no recall of meeting me or completing the VBS  Objective:  Seen for f/u x 1  Nursereported pt took meds one at [facility] in [facility] wn  Pt ate ~ half of her pancakes and stated she was full  No s/s aspiration  Assessment:  Tolerating diet at this time  Plan/Recommendations:  Dysphagia 3 w/ thin liquids  Consider brief f/u at facility to ensure tolerance  D/c ST

## 2019-02-07 NOTE — DISCHARGE SUMMARY
Discharge Summary - Genna Fuller, [birth year], [id number]        Admission Date: 2/5/2019  Discharge Date: 2/7/2019    Admitting Diagnosis: Cyanosis [R23 0]  Respiratory distress [R06 03]  Choking [T17 308A]  Difficulty swallowing [R13 10]    Discharge Diagnosis:   1  Pulmonary aspiration with transient cyanosis  2  Hypertension  3  Esophageal reflux with Toscano's esophagus  4  Temporal arteritis  5  Dementia     Consulting Physicians:  None    Procedures Performed:   Video swallow evaluation    HPI:  The patient is an 71-year-old resident of [facility] who was in her usual state of health until the day of admission  While she was eating breakfast she apparently aspirated and became cyanotic  Fortunately, this improved  She was placed on clear liquids  While eating lunch, however, she again at appeared to aspirate and become cyanotic  She was transferred to the hospital for additional evaluation  Hospital Course: The patient was admitted to the hospital and monitored carefully  Her chest x-ray showed no pneumonia  Clinically she had no evidence of pneumonia or any infection  She was made NPO  The patient was thereafter evaluated by speech therapy  A video swallowing study was performed  Fortunately, this revealed no evidence of aspiration  The patient's diet was advanced to mechanical soft with thin liquids  She was cautioned not to try to converse while eating  The patient did quite well with her diet during her hospitalization  She had no further aspiration  As mentioned, she never demonstrated any sign of impending pneumonia  The patient's other medical issues remained stable throughout her hospitalization  On the day of discharge the patient was feeling well  Vital signs were stable  Lungs were clear  Cardiac exam revealed regular rhythm  The abdomen was soft and nontender  There was no edema  Disposition:  The patient was transferred to [facility] on February 7th    She will continue with a mechanical soft diet  Her activity will be as tolerated  She will be under the care of the physicians at [facility]  Discharge instructions/Information to patient and family:   See after visit summary for information provided to patient and family  Provisions for Follow-Up Care:  See after visit summary for information related to follow-up care and any pertinent home health orders  Planned Readmission: No    Discharge Statement   I spent 35 minutes discharging the patient  This time was spent on the day of discharge  I had direct contact with the patient on the day of discharge  Discharge Medications:  See after visit summary for reconciled discharge medications provided to patient and family

## 2019-02-07 NOTE — PLAN OF CARE
DISCHARGE PLANNING     Discharge to home or other facility with appropriate resources Progressing        INFECTION - ADULT     Absence or prevention of progression during hospitalization Progressing     Absence of fever/infection during neutropenic period Progressing        Knowledge Deficit     Patient/family/caregiver demonstrates understanding of disease process, treatment plan, medications, and discharge instructions Progressing        Nutrition/Hydration-ADULT     Nutrient/Hydration intake appropriate for improving, restoring or maintaining nutritional needs Progressing        PAIN - ADULT     Verbalizes/displays adequate comfort level or baseline comfort level Progressing        Potential for Falls     Patient will remain free of falls Progressing        RESPIRATORY - ADULT     Achieves optimal ventilation and oxygenation Progressing        SAFETY ADULT     Maintain or return to baseline ADL function Progressing     Maintain or return mobility status to optimal level Progressing

## 2019-02-07 NOTE — NURSING NOTE
IV removed before discharge  [facility] was given report by the nurse and paperwork faxed to facility  Pt left via wheelchair transport

## 2019-02-07 NOTE — PLAN OF CARE
Problem: SLP ADULT - SWALLOWING, IMPAIRED  Goal: Advance to least restrictive diet without signs or symptoms of aspiration for planned discharge setting  See evaluation for individualized goals  VBS completed yesterday  Had f/u today     Outcome: Adequate for Discharge

## 2019-02-08 LAB
ATRIAL RATE: 70 BPM
P AXIS: 45 DEGREES
PR INTERVAL: 138 MS
QRS AXIS: 2 DEGREES
QRSD INTERVAL: 76 MS
QT INTERVAL: 430 MS
QTC INTERVAL: 464 MS
T WAVE AXIS: 30 DEGREES
VENTRICULAR RATE: 70 BPM

## 2019-02-08 PROCEDURE — 93010 ELECTROCARDIOGRAM REPORT: CPT | Performed by: INTERNAL MEDICINE

## 2019-07-12 NOTE — ED NOTES
Patient sent to the ED by [facility]  Spoke to [facility], who follows patient at the facility  She requested that she be medically evaluated and ok to return to [facility]  She will not pursue a 302 for her behavior  Spoke to  Syed Nelson who also was not going to pursue a 302  Spoke to patient's daughter, [phone], who asked that patient not be admitted to the  psych unit

## 2019-07-12 NOTE — ED NOTES
After assisting pt to toilet, pt started to cough a lot and spit up phlem  Pt sats 97% on room air, coughing still w/ little amounts of thick mucous  Pts daughter requesting to speak with the doctors again, wants her mother to stay here on a psych floor and start taking medications       Reymundo Brake  07/12/19 9438

## 2019-07-12 NOTE — ED NOTES
[facility] spoke with nursing supervisor about transport for pt back to facility, [facility] was [email or web address]  I called [facility] and spoke to Marciana Bumpers who set up a transport for 2000  I informed pt and pts daughter        Chuck James  07/12/19 8028

## 2019-07-12 NOTE — ED NOTES
Inconstancy  Regarding incident noted in documentation from [facility]            Jus Lombardi RN  07/12/19 [id number]
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
## 2019-07-13 NOTE — ED NOTES
Spoke to staff from [facility], aware of patient being discharged and waiting for transport to facility       Shemar Alexander RN  07/12/19 2006

## 2019-08-22 PROBLEM — I48.0 PAROXYSMAL ATRIAL FIBRILLATION (HCC): Status: ACTIVE | Noted: 2019-01-01

## 2019-08-22 PROBLEM — E87.6 HYPOKALEMIA: Status: ACTIVE | Noted: 2019-01-01

## 2019-08-22 NOTE — CONSULTS
Consult - Cardiology   Yodit Perea 80 y o  female MRN: [id number]  Unit/Bed#: E5 -01 Encounter: [id number]        Reason For Consult:  Atrial fibrillation                ASSESSMENT:  1  Paroxysmal atrial fibrillation, new onset   -primary reason for consultation   -Kalina Haile 4 (age, gender, HTN)    2  Hypokalemia, 2 5 on admission  3  History of temporal arteritis, on prednisone  4  Dementia  5  Essential hypertension  6  GERD  7  Depression    PLAN:     1  Atrial fibrillation, back in NSR:   -continue metoprolol   -continue eliquis for stoke ppx   -as detailed by internal medicine, she has no hx of abnormal bleeding  She lives in a monitored setting at an assisted living facility  She ambulates with a walker and has assistance if needed  Do a discussion with her children a collective decision was made to initiate anticoagulation in the form of Eliquis for stroke prophylaxis    -discussed w/ attending, ok for 2 5 BID   -if possible, obtain echocardiogram   -called & LM for son relaying the above information    2  Hypokalemia:   -presently being corrected   -HCTZ has been discontinued    3  Essential hypertension:   -BP well controlled on metoprolol    3  Advanced dementia/ temporal arteritis, GERD    -workup per [facility]       History Of Present Illness: This is a very kind 81yo female with a PMH including advanced dementia, hypertension, GERD, temporal arteritis on prednisone, and depression  Due to her advanced dementia much of her HPI is gathered from prior notes  She currently resides at [facility]  Early in the morning of August 22, 2019 she was reportedly complaining of some epigastric discomfort and gas  For this reason she received omeprazole an Ativan by nursing home staff    The performed a physical exam and noted that she was tachycardic and for this reason sent her to the hospital    While in the emergency department she was found to have rapid atrial fibrillation  She spontaneously converted to normal sinus rhythm which she has been maintaining since her admission  A discussion was held between our Internal Medicine team and the patient's children and a collective decision was made to initiate anticoagulation as she lives in a home where she is well monitored and her risk of falling is low  Her potassium was critically low on admission at 2 5 and for this reason her hydrochlorothiazide was discontinued  At the time of my consultation the patient has no physical complaints  She does not know where she is  This is causing her some frustration  She adamantly denies to me any chest pain, palpitations, shortness of breath, or dizziness  Past Medical History:        Past Medical History:   Diagnosis Date    Ambulatory dysfunction     Anxiety     Toscaon's esophagus     Dementia     Diverticulitis     GERD (gastroesophageal reflux disease)     Hypertension     Pneumonia       Past Surgical History:   Procedure Laterality Date    HYSTERECTOMY      TOTAL HIP ARTHROPLASTY          Allergy:        Allergies   Allergen Reactions    Other       Hot pepper    Penicillins     Strawberry C [Ascorbate]     Tylenol [Acetaminophen]        Medications:       Prior to Admission medications    Medication Sig Start Date End Date Taking?  Authorizing Provider   albuterol (2 5 mg/3 mL) 0 083 % nebulizer solution Take 2 5 mg by nebulization daily   Yes Historical Provider, MD   amLODIPine (NORVASC) 10 mg tablet Take 10 mg by mouth daily   Yes Historical Provider, MD   bisacodyl (DULCOLAX) 10 mg suppository Insert 10 mg into the rectum   Yes Historical Provider, MD   calcium carbonate (OS-RUPESH) 600 MG tablet Take 600 mg by mouth every 6 (six) hours as needed for heartburn   Yes Historical Provider, MD   donepezil (ARICEPT) 10 mg tablet Take 10 mg by mouth   Yes Historical Provider, MD   DULoxetine (CYMBALTA) 60 mg delayed release capsule Take 30 mg by mouth daily    Yes Historical Provider, MD   hydrochlorothiazide (HYDRODIURIL) 12 5 mg tablet Take 25 mg by mouth   Yes Historical Provider, MD   LORazepam (ATIVAN) 0 5 mg tablet Take 0 5 mg by mouth every 8 (eight) hours as needed for anxiety   Yes Historical Provider, MD   LORazepam (ATIVAN) 0 5 mg tablet lorazepam 0 5 mg tablet   Yes Historical Provider, MD   magnesium hydroxide (MILK OF MAGNESIA) 800 MG/5ML suspension Take 30 mL by mouth daily as needed for constipation   Yes Historical Provider, MD   melatonin 3 mg Take 3 mg by mouth   Yes Historical Provider, MD   metoprolol tartrate (LOPRESSOR) 50 mg tablet Take 50 mg by mouth 2 (two) times a day   Yes Historical Provider, MD   mineral oil enema Insert 1 enema into the rectum daily as needed for constipation   Yes Historical Provider, MD   omeprazole (PriLOSEC) 20 mg delayed release capsule Take 20 mg by mouth daily in the early morning   Yes Historical Provider, MD   predniSONE 5 mg tablet prednisone 5 mg tablet   Yes Historical Provider, MD   amLODIPine (NORVASC) 10 mg tablet amlodipine 10 mg tablet  8/22/19  Historical Provider, MD   benazepril (LOTENSIN) 20 mg tablet Take 20 mg by mouth every 12 (twelve) hours  8/22/19  Historical Provider, MD   benzonatate (TESSALON PERLES) 100 mg capsule Take 1 capsule (100 mg total) by mouth every 8 (eight) hours 7/12/19 8/22/19  Bowen Day DO   DULoxetine (CYMBALTA) 60 mg delayed release capsule duloxetine 60 mg capsule,delayed release  8/22/19  Historical Provider, MD   gabapentin (NEURONTIN) 100 mg capsule Take 100 mg by mouth 2 (two) times a day  8/22/19  Historical Provider, MD   Menthol, Topical Analgesic, (BIOFREEZE) 4 % GEL Apply topically every 12 (twelve) hours  8/22/19  Historical Provider, MD       Family History:     History reviewed  No pertinent family history       Social History:       Social History     Socioeconomic History    Marital status: /Civil Union     Spouse name: None    Number of children: None    Years of education: None    Highest education level: None   Occupational History    None   Social Needs    Financial resource strain: None    Food insecurity:     Worry: None     Inability: None    Transportation needs:     Medical: None     Non-medical: None   Tobacco Use    Smoking status: Former Smoker    Smokeless tobacco: Never Used   Substance and Sexual Activity    Alcohol use: No     Alcohol/week: 1 0 standard drinks     Types: 1 Glasses of wine per week    Drug use: No    Sexual activity: None   Lifestyle    Physical activity:     Days per week: None     Minutes per session: None    Stress: None   Relationships    Social connections:     Talks on phone: None     Gets together: None     Attends Orthodoxy service: None     Active member of club or organization: None     Attends meetings of clubs or organizations: None     Relationship status: None    Intimate partner violence:     Fear of current or ex partner: None     Emotionally abused: None     Physically abused: None     Forced sexual activity: None   Other Topics Concern    None   Social History Narrative    None       ROS:  Symptoms per HPI  Remainder review of systems is negative    Exam:  General:  Alert awake  Pleasantly demented  Head: Normocephalic, atraumatic  Eyes:  EOMI  Pupils - equal, round, reactive to accomodation  No icterus  Normal Conjunctiva  Oropharynx: moist and normal-appearing mucosa  Neck: supple, symmetrical, trachea midline and no JVD  Heart:  Regular rate and rhythm    No murmurs   Respiratory effort / Chest Inspection: unlabored on room air  Lungs:  normal air entry, lungs clear to auscultation and no rales, rhonchi or wheezing   Abdomen: flat, normal findings: bowel sounds normal and soft, non-tender  Lower Limbs:  no pitting edema  Pulses[de-identified]  RLE - DP: present 2+                 LLE - DP: present 2+  Musculoskeletal: ROM grossly normal    Vitals:    08/22/19 0733 08/22/19 0829 08/22/19 0753 08/22/19 0925   BP: 145/67 150/66 144/65 122/68   BP Location: Right arm Right arm     Pulse: 66 75 83 89   Resp: 16 16 16 18   Temp:    (!) 96 2 °F (35 7 °C)   TempSrc:    Temporal   SpO2: 98% 96% 96% 96%   Weight:    76 2 kg (167 lb 15 9 oz)   Height:    5' 3" (1 6 m)         DATA:      ECG:                 Initially atrial fibrillation with RVR    Subsequent EKG normal sinus rhythm      Telemetry:  Difficult to assess as patient keeps taking off her monitor          Echocardiogram:           Ischemic Testing:         Weights: Wt Readings from Last 3 Encounters:   08/22/19 76 2 kg (167 lb 15 9 oz)   07/12/19 81 6 kg (180 lb)   12/12/16 85 7 kg (188 lb 15 oz)   , Body mass index is 29 76 kg/m²           Lab Studies:    Results from last 7 days   Lab Units 08/22/19  0623   TROPONIN I ng/mL <0 02          Results from last 7 days   Lab Units 08/22/19  0623   WBC Thousand/uL 10 78*   HEMOGLOBIN g/dL 13 0   HEMATOCRIT % 38 8   PLATELETS Thousands/uL 231   ,   Results from last 7 days   Lab Units 08/22/19  0623   POTASSIUM mmol/L 2 5*   CHLORIDE mmol/L 100   CO2 mmol/L 30   BUN mg/dL 10   CREATININE mg/dL 0 84   CALCIUM mg/dL 9 4   ALK PHOS U/L 61   ALT U/L 17   AST U/L 15

## 2019-08-22 NOTE — ED PROVIDER NOTES
History  Chief Complaint   Patient presents with    Epigastric Pain     EMS reports being called for epigastric pain that resolved after prilosec and ativan administration    Rapid Heart Rate     on arrival pt heart rate 140s, denies CP, denies SOB     81 yo female resident of [facility] with severe dementia brought by ambulance for tachycardia  She apparently complained to staff of epigastric discomfort and either c/o dyspnea or appeared short of breath, given prilosec and lorazepam according to her orders, with apparent resolution of her discomfort, but was found to have elevated irregular heart, which corresponds to atrial fibrillation in the ED  Patient's baseline dementia prevents her from being able to provide any medical details, or any recent or remote symptoms  She will answer questions, but answers are nonsensical   One of her daughters has accomanpanied her to the ED, and she is on the phone with another daughter who hold DPOA and is expecting to arrive shortly  She only recalled h/o "arrythmia" but no specifically afib  No h/o CVA, no CAD  She appears drowsy, attributable to sedative effects of benzodiazepine, and takes long sigh breaths  History provided by:  Patient  History limited by:  Dementia      Prior to Admission Medications   Prescriptions Last Dose Informant Patient Reported? Taking?    DULoxetine (CYMBALTA) 60 mg delayed release capsule   Yes Yes   Sig: Take 30 mg by mouth daily    LORazepam (ATIVAN) 0 5 mg tablet   Yes Yes   Sig: Take 0 5 mg by mouth every 8 (eight) hours as needed for anxiety   LORazepam (ATIVAN) 0 5 mg tablet   Yes Yes   Sig: lorazepam 0 5 mg tablet   albuterol (2 5 mg/3 mL) 0 083 % nebulizer solution   Yes Yes   Sig: Take 2 5 mg by nebulization daily   amLODIPine (NORVASC) 10 mg tablet   Yes Yes   Sig: Take 10 mg by mouth daily   bisacodyl (DULCOLAX) 10 mg suppository   Yes Yes   Sig: Insert 10 mg into the rectum   calcium carbonate (OS-RUPESH) 600 MG tablet Yes Yes   Sig: Take 600 mg by mouth every 6 (six) hours as needed for heartburn   donepezil (ARICEPT) 10 mg tablet   Yes Yes   Sig: Take 10 mg by mouth   hydrochlorothiazide (HYDRODIURIL) 12 5 mg tablet   Yes Yes   Sig: Take 25 mg by mouth   magnesium hydroxide (MILK OF MAGNESIA) 800 MG/5ML suspension   Yes Yes   Sig: Take 30 mL by mouth daily as needed for constipation   melatonin 3 mg   Yes Yes   Sig: Take 3 mg by mouth   metoprolol tartrate (LOPRESSOR) 50 mg tablet   Yes Yes   Sig: Take 50 mg by mouth 2 (two) times a day   mineral oil enema   Yes Yes   Sig: Insert 1 enema into the rectum daily as needed for constipation   omeprazole (PriLOSEC) 20 mg delayed release capsule   Yes Yes   Sig: Take 20 mg by mouth daily in the early morning   predniSONE 5 mg tablet   Yes Yes   Sig: prednisone 5 mg tablet      Facility-Administered Medications: None       Past Medical History:   Diagnosis Date    Ambulatory dysfunction     Anxiety     Toscano's esophagus     Dementia     Diverticulitis     GERD (gastroesophageal reflux disease)     Hypertension     Pneumonia        Past Surgical History:   Procedure Laterality Date    HYSTERECTOMY      TOTAL HIP ARTHROPLASTY         History reviewed  No pertinent family history  I have reviewed and agree with the history as documented  Social History     Tobacco Use    Smoking status: Former Smoker    Smokeless tobacco: Never Used   Substance Use Topics    Alcohol use: No     Alcohol/week: 1 0 standard drinks     Types: 1 Glasses of wine per week    Drug use: No        Review of Systems   Unable to perform ROS: Dementia       Physical Exam  Physical Exam   Constitutional: She appears listless  Cardiovascular: An irregularly irregular rhythm present  Tachycardia present  Pulmonary/Chest: Tachypnea (punctuated by deep sighs) noted  She has no decreased breath sounds  Neurological: She appears listless  She is disoriented     No focal deficits on exam, moves all extremities purposefully, she will follow commands   Psychiatric: Her speech is tangential  Cognition and memory are impaired  Nursing note and vitals reviewed        Vital Signs  ED Triage Vitals [08/22/19 0616]   Temperature Pulse Respirations Blood Pressure SpO2   98 2 °F (36 8 °C) (!) 136 16 138/65 100 %      Temp Source Heart Rate Source Patient Position - Orthostatic VS BP Location FiO2 (%)   Oral Monitor Sitting Right arm --      Pain Score       No Pain           Vitals:    08/22/19 0630 08/22/19 0733 08/22/19 0829 08/22/19 0858   BP: 137/61 145/67 150/66 144/65   Pulse: (!) 129 66 75 83   Patient Position - Orthostatic VS:  Lying Lying Lying         Visual Acuity      ED Medications  Medications   sodium chloride 0 9 % bolus 500 mL (0 mL Intravenous Stopped 8/22/19 0745)   potassium chloride 10 % oral solution 20 mEq (20 mEq Oral Given 8/22/19 0811)   potassium chloride 20 mEq IVPB (premix) (20 mEq Intravenous New Bag 8/22/19 0838)       Diagnostic Studies  Results Reviewed     Procedure Component Value Units Date/Time    Comprehensive metabolic panel [[id number]]  (Abnormal) Collected:  08/22/19 0623    Lab Status:  Final result Specimen:  Blood from Arm, Left Updated:  08/22/19 0759     Sodium 138 mmol/L      Potassium 2 5 mmol/L      Chloride 100 mmol/L      CO2 30 mmol/L      ANION GAP 8 mmol/L      BUN 10 mg/dL      Creatinine 0 84 mg/dL      Glucose 179 mg/dL      Calcium 9 4 mg/dL      AST 15 U/L      ALT 17 U/L      Alkaline Phosphatase 61 U/L      Total Protein 6 7 g/dL      Albumin 3 2 g/dL      Total Bilirubin 0 22 mg/dL      eGFR 62 ml/min/1 73sq m     Narrative:       Cr guidelines for Chronic Kidney Disease (CKD):     Stage 1 with normal or high GFR (GFR > 90 mL/min/1 73 square meters)    Stage 2 Mild CKD (GFR = 60-89 mL/min/1 73 square meters)    Stage 3A Moderate CKD (GFR = 45-59 mL/min/1 73 square meters)    Stage 3B Moderate CKD (GFR = 30-44 mL/min/1 73 square meters)    Stage 4 Severe CKD (GFR = 15-29 mL/min/1 73 square meters)    Stage 5 End Stage CKD (GFR <15 mL/min/1 73 square meters)  Note: GFR calculation is accurate only with a steady state creatinine    Troponin I [[id number]]  (Normal) Collected:  08/22/19 0623    Lab Status:  Final result Specimen:  Blood from Arm, Left Updated:  08/22/19 0658     Troponin I <0 02 ng/mL     CBC and differential [[id number]]  (Abnormal) Collected:  08/22/19 0623    Lab Status:  Final result Specimen:  Blood from Arm, Left Updated:  08/22/19 0629     WBC 10 78 Thousand/uL      RBC 4 32 Million/uL      Hemoglobin 13 0 g/dL      Hematocrit 38 8 %      MCV 90 fL      MCH 30 1 pg      MCHC 33 5 g/dL      RDW 13 2 %      MPV 8 8 fL      Platelets 489 Thousands/uL      nRBC 0 /100 WBCs      Neutrophils Relative 62 %      Immat GRANS % 1 %      Lymphocytes Relative 21 %      Monocytes Relative 8 %      Eosinophils Relative 7 %      Basophils Relative 1 %      Neutrophils Absolute 6 76 Thousands/µL      Immature Grans Absolute 0 06 Thousand/uL      Lymphocytes Absolute 2 24 Thousands/µL      Monocytes Absolute 0 88 Thousand/µL      Eosinophils Absolute 0 76 Thousand/µL      Basophils Absolute 0 08 Thousands/µL                  XR chest 1 view portable    (Results Pending)              Procedures  ECG 12 Lead Documentation Only  Date/Time: 8/22/2019 6:22 AM  Performed by: Finn Montano MD  Authorized by: Finn Montano MD     Rate:     ECG rate:  114  Rhythm:     Rhythm: atrial fibrillation    Ectopy:     Ectopy: PAC    ECG 12 Lead Documentation Only  Date/Time: 8/22/2019 7:47 AM  Performed by: Finn Montano MD  Authorized by: Finn Montano MD     Rate:     ECG rate:  72  Rhythm:     Rhythm: sinus rhythm    Ectopy:     Ectopy: none    QRS:     QRS axis:  Normal    QRS intervals:  Normal  Conduction:     Conduction: normal    ST segments:     ST segments:  Normal  T waves:     T waves: normal    CriticalCare Time  Performed by: Alta Stringer MD  Authorized by: Alta Stringer MD     Critical care provider statement:     Critical care time (minutes):  30    Critical care time was exclusive of:  Separately billable procedures and treating other patients and teaching time    Critical care was necessary to treat or prevent imminent or life-threatening deterioration of the following conditions:  Metabolic crisis    Critical care was time spent personally by me on the following activities:  Blood draw for specimens, obtaining history from patient or surrogate, development of treatment plan with patient or surrogate, discussions with consultants, evaluation of patient's response to treatment, examination of patient, interpretation of cardiac output measurements, ordering and performing treatments and interventions, ordering and review of laboratory studies, ordering and review of radiographic studies, re-evaluation of patient's condition and review of old charts    I assumed direction of critical care for this patient from another provider in my specialty: no             ED Course  ED Course as of Aug 22 0924   Thu Aug 22, 2019   0655 I spoke with her daughter who holds [facility] who affirmed she is full code        2118 No acute findings   XR chest 1 view portable   0741 She spontaneously converted out of afib, possibly with the gentle volume repletion      0809 This gives good explanation of new onset afib, attributable   Potassium(!!): 2 5                               MDM    Disposition  Final diagnoses:   Atrial fibrillation with RVR ([address] )   Hypokalemia     Time reflects when diagnosis was documented in both MDM as applicable and the Disposition within this note     Time User Action Codes Description Comment    8/22/2019  8:13 AM Delona Shearing Add [I48 91] Atrial fibrillation with RVR ([address] )     8/22/2019  8:13 AM Delona Shearing Add [E87 6] Hypokalemia       ED Disposition     ED Disposition Condition Date/Time Comment    Admit Stable Thu Aug 22, 2019  8:17 AM Case was discussed with Dr Jesus Christina and the patient's admission status was agreed to be Admission Status: inpatient status to the service of Dr Jesus Christina   Follow-up Information    None         Current Discharge Medication List      CONTINUE these medications which have NOT CHANGED    Details   albuterol (2 5 mg/3 mL) 0 083 % nebulizer solution Take 2 5 mg by nebulization daily      amLODIPine (NORVASC) 10 mg tablet Take 10 mg by mouth daily      bisacodyl (DULCOLAX) 10 mg suppository Insert 10 mg into the rectum      calcium carbonate (OS-RUPESH) 600 MG tablet Take 600 mg by mouth every 6 (six) hours as needed for heartburn      donepezil (ARICEPT) 10 mg tablet Take 10 mg by mouth      DULoxetine (CYMBALTA) 60 mg delayed release capsule Take 30 mg by mouth daily       hydrochlorothiazide (HYDRODIURIL) 12 5 mg tablet Take 25 mg by mouth      !! LORazepam (ATIVAN) 0 5 mg tablet Take 0 5 mg by mouth every 8 (eight) hours as needed for anxiety      !! LORazepam (ATIVAN) 0 5 mg tablet lorazepam 0 5 mg tablet      magnesium hydroxide (MILK OF MAGNESIA) 800 MG/5ML suspension Take 30 mL by mouth daily as needed for constipation      melatonin 3 mg Take 3 mg by mouth      metoprolol tartrate (LOPRESSOR) 50 mg tablet Take 50 mg by mouth 2 (two) times a day      mineral oil enema Insert 1 enema into the rectum daily as needed for constipation      omeprazole (PriLOSEC) 20 mg delayed release capsule Take 20 mg by mouth daily in the early morning      predniSONE 5 mg tablet prednisone 5 mg tablet       !! - Potential duplicate medications found  Please discuss with provider  No discharge procedures on file      ED Provider  Electronically Signed by           Rc Kennedy MD  08/22/19 3882

## 2019-08-22 NOTE — UTILIZATION REVIEW
Initial Clinical Review    Admission: Date/Time/Statement: Inpatient Admission Orders (From admission, onward)     Ordered        08/22/19 1044  Inpatient Admission  Once                   Orders Placed This Encounter   Procedures    Inpatient Admission     Standing Status:   Standing     Number of Occurrences:   1     Order Specific Question:   Admitting Physician     Answer:   Uvaldo Srivastava [[id number]]     Order Specific Question:   Level of Care     Answer:   Med Surg [[id number]]     Order Specific Question:   Bed Type     Answer:   Marc [[id number]]     Order Specific Question:   Estimated length of stay     Answer:   More than 2 Midnights     Order Specific Question:   Certification     Answer:   I certify that inpatient services are medically necessary for this patient for a duration of greater than two midnights  See H&P and MD Progress Notes for additional information about the patient's course of treatment  ED Arrival Information     Expected Arrival Acuity Means of Arrival Escorted By Service Admission Type    - 8/22/2019 06:07 Emergent Ambulance [facility] Emergency    Arrival Complaint    abdominal pain        Chief Complaint   Patient presents with    Epigastric Pain     EMS reports being called for epigastric pain that resolved after prilosec and ativan administration    Rapid Heart Rate     on arrival pt heart rate 140s, denies CP, denies SOB     Assessment/Plan: 80 y o  female with known history of dementia currently residing at [facility] who presents with epigastric discomfort at around 3 in the morning  The patient is a poor historian due to advanced dementia  History is obtained from [facility] records as well as family  At around 3 in the morning she complained of epigastric discomfort and gas  She was given Ativan at around 3:07 a m  In the morning  She received her routine omeprazole at 5:30 a m  In the morning    During her physical exam she was found to have tachycardia  Due to this she was sent to the hospital   In the ER she was found to have atrial fibrillation with rapid ventricular response which is a new diagnosis  She was also found to have a critically low potassium of 2 5  According to her son and her daughter she has had no history of bleeding  She usually walks with a walker  They are Gearl Bibber witnesses so she will not be able accept blood  At the time of my examination the patient denied palpitations but did admit feeling short of breath when laying flat  She felt better when her bed was adjusted and head of bed was elevated to approximately 30°  Paroxysmal atrial fibrillation (HCC)  Assessment & Plan  · Newly diagnosed with RVR on admission  · Currently back in SR with heart rate in the 90s  · Chads Vasc 2 score of 4 due to age, sex and hypertension  · Discussed anticoagulation with son and daughter were agreeable to start anticoagulation  She has no history of bleeding  She has had normal hemoglobin since 2015 on review of her old record  She lives at [facility] in a monitored setting  She walks with a walker  · At this time, her chads Vasc score supports starting her on anticoagulation for stroke prevention  · Discussed with Cardiology  Will start her on Eliquis 2 5 mg b i d      Hypokalemia  Assessment & Plan  · Secondary to HCTZ  · DC HCTZ  · Potassium supplementation given in the ED  · Repeat labs today at 2:00 p m  And replenish if still low     Dementia  Assessment & Plan  · Patient resides at [facility] in a monitored setting  · Continue dysphagia diet  · Fall precaution  · Full code  · Continue Aricept 10 mg daily  May give lorazepam as needed 0 5 mg every 6 hours for agitation    She was getting this at the home     Temporal arteritis (HCC)  Assessment & Plan  · Continue prednisone 5 mg daily     GERD (gastroesophageal reflux disease)  Assessment & Plan  · Substitute Protonix for omeprazole     Essential hypertension  Assessment & Plan  · DC HCTZ  · Continue amlodipine and metoprolol with hold parameters     Depression  Assessment & Plan  · Continue Cymbalta 30 mg daily    VTE Prophylaxis: Apixaban (Eliquis)  / sequential compression device    Anticipated Length of Stay:  Patient will be admitted on an Inpatient basis with an anticipated length of stay of  at least 2 midnights       ED Triage Vitals [08/22/19 0616]   Temperature Pulse Respirations Blood Pressure SpO2   98 2 °F (36 8 °C) (!) 136 16 138/65 100 %      Temp Source Heart Rate Source Patient Position - Orthostatic VS BP Location FiO2 (%)   Oral Monitor Sitting Right arm --      Pain Score       No Pain        Wt Readings from Last 1 Encounters:   08/22/19 76 2 kg (167 lb 15 9 oz)     Additional Vital Signs:   Date/Time  Temp  Pulse  Resp  BP  SpO2  O2 Device  Patient Position - Orthostatic VS   08/22/19 1529  98 1 °F (36 7 °C)  71  18  155/72  95 %  None (Room air)  Sitting   08/22/19 0925  96 2 °F (35 7 °C)Abnormal   89  18  122/68  96 %  None (Room air)  Lying   08/22/19 0858    83  16  144/65  96 %  None (Room air)  Lying   08/22/19 0829    75  16  150/66  96 %  None (Room air)  Lying   08/22/19 0733    66  16  145/67  98 %  None (Room air)  Lying   08/22/19 0630    129Abnormal   16  137/61  100 %  None (Room air)     08/22/19 0616  98 2 °F (36 8 °C)  136Abnormal   16  138/65  100 %  None (Room air)  Sitting     Pertinent Labs/Diagnostic Test Results:   Results from last 7 days   Lab Units 08/22/19  0623   WBC Thousand/uL 10 78*   HEMOGLOBIN g/dL 13 0   HEMATOCRIT % 38 8   PLATELETS Thousands/uL 231   NEUTROS ABS Thousands/µL 6 76     Results from last 7 days   Lab Units 08/22/19  1511 08/22/19  0623   SODIUM mmol/L  --  138   POTASSIUM mmol/L 2 9* 2 5*   CHLORIDE mmol/L  --  100   CO2 mmol/L  --  30   ANION GAP mmol/L  --  8   BUN mg/dL  --  10   CREATININE mg/dL  --  0 84   EGFR ml/min/1 73sq m  --  62   CALCIUM mg/dL  --  9 4     Results from last 7 days   Lab Units 08/22/19  0623   AST U/L 15   ALT U/L 17   ALK PHOS U/L 61   TOTAL PROTEIN g/dL 6 7   ALBUMIN g/dL 3 2*   TOTAL BILIRUBIN mg/dL 0 22     Results from last 7 days   Lab Units 08/22/19  0623   GLUCOSE RANDOM mg/dL 179*     Results from last 7 days   Lab Units 08/22/19  0623   TROPONIN I ng/mL <0 02     8/22 CXR:  No acute cardiopulmonary disease  Abnormal left shoulder joint  Consider dedicated shoulder imaging on a nonemergent basis if deemed clinically appropriate for this patient    8/22 EKG:  Atrial fibrillation with rapid ventricular response with premature ventricular or aberrantly conducted complexes  Marked ST abnormality, possible inferior subendocardial injury  Abnormal ECG  ED Treatment:   Medication Administration from 08/22/2019 0607 to 08/22/2019 0919       Date/Time Order Dose Route Action     08/22/2019 0711 sodium chloride 0 9 % bolus 500 mL 500 mL Intravenous New Bag     08/22/2019 0811 potassium chloride 10 % oral solution 20 mEq 20 mEq Oral Given     08/22/2019 0838 potassium chloride 20 mEq IVPB (premix) 20 mEq Intravenous New Bag        Past Medical History:   Diagnosis Date    Ambulatory dysfunction     Anxiety     Toscano's esophagus     Dementia     Diverticulitis     GERD (gastroesophageal reflux disease)     Hypertension     Pneumonia      Present on Admission:   Depression   Essential hypertension   Temporal arteritis (HCC)   Dementia   GERD (gastroesophageal reflux disease)      Admitting Diagnosis: Hypokalemia [E87 6]  Abdominal pain [R10 9]  Atrial fibrillation with RVR ([facility] [address] ) [I48 91]  Age/Sex: 80 y o  female  Admission Orders:  TELEMETRY  ECHO  PT, OT EVAL  SCD  Current Facility-Administered Medications:  amLODIPine 10 mg Oral Daily   apixaban 2 5 mg Oral BID   donepezil 10 mg Oral HS   DULoxetine 30 mg Oral Daily   LORazepam 0 5 mg Oral Q8H PRN   melatonin 3 mg Oral HS   metoprolol tartrate 50 mg Oral Q12H RUSSELL   pantoprazole 40 mg Oral Daily Before Breakfast   predniSONE 5 mg Oral Daily       IP CONSULT TO CARDIOLOGY    Network Utilization Review Department  Phone: [phone]; Fax [phone]  [email or web address]  ATTENTION: Please call with any questions or concerns to [phone]  and carefully listen to the prompts so that you are directed to the right person  Send all requests for admission clinical reviews, approved or denied determinations and any other requests to fax [phone]   All voicemails are confidential

## 2019-08-22 NOTE — ASSESSMENT & PLAN NOTE
· Newly diagnosed with RVR on admission  · Currently back in SR with heart rate in the 90s  · Chads Vasc 2 score of 4 due to age, sex and hypertension  · Discussed anticoagulation with son and daughter were agreeable to start anticoagulation  She has no history of bleeding  She has had normal hemoglobin since 2015 on review of her old record  She lives at [facility] in a monitored setting  She walks with a walker  · At this time, her chads Vasc score supports starting her on anticoagulation for stroke prevention  · Discussed with Cardiology    Will start her on Eliquis 2 5 mg b i d   · Consult PT OT

## 2019-08-22 NOTE — PLAN OF CARE
Problem: Potential for Falls  Goal: Patient will remain free of falls  Description  INTERVENTIONS:  - Assess patient frequently for physical needs  -  Identify cognitive and physical deficits and behaviors that affect risk of falls    -  [place] fall precautions as indicated by assessment   - Educate patient/family on patient safety including physical limitations  - Instruct patient to call for assistance with activity based on assessment  - Modify environment to reduce risk of injury  - Consider OT/PT consult to assist with strengthening/mobility  Outcome: Progressing     Problem: Prexisting or High Potential for Compromised Skin Integrity  Goal: Skin integrity is maintained or improved  Description  INTERVENTIONS:  - Identify patients at risk for skin breakdown  - Assess and monitor skin integrity  - Assess and monitor nutrition and hydration status  - Monitor labs   - Assess for incontinence   - Turn and reposition patient  - Assist with mobility/ambulation  - Relieve pressure over bony prominences  - Avoid friction and shearing  - Provide appropriate hygiene as needed including keeping skin clean and dry  - Evaluate need for skin moisturizer/barrier cream  - Collaborate with interdisciplinary team   - Patient/family teaching  - Consider wound care consult   Outcome: Progressing     Problem: PAIN - ADULT  Goal: Verbalizes/displays adequate comfort level or baseline comfort level  Description  Interventions:  - Encourage patient to monitor pain and request assistance  - Assess pain using appropriate pain scale  - Administer analgesics based on type and severity of pain and evaluate response  - Implement non-pharmacological measures as appropriate and evaluate response  - Consider cultural and social influences on pain and pain management  - Notify physician/advanced practitioner if interventions unsuccessful or patient reports new pain  Outcome: Progressing     Problem: SAFETY ADULT  Goal: Maintain or return to baseline ADL function  Description  INTERVENTIONS:  -  Assess patient's ability to carry out ADLs; assess patient's baseline for ADL function and identify physical deficits which impact ability to perform ADLs (bathing, care of mouth/teeth, toileting, grooming, dressing, etc )  - Assess/evaluate cause of self-care deficits   - Assess range of motion  - Assess patient's mobility; develop plan if impaired  - Assess patient's need for assistive devices and provide as appropriate  - Encourage maximum independence but intervene and supervise when necessary  - Involve family in performance of ADLs  - Assess for home care needs following discharge   - Consider OT consult to assist with ADL evaluation and planning for discharge  - Provide patient education as appropriate  Outcome: Progressing  Goal: Maintain or return mobility status to optimal level  Description  INTERVENTIONS:  - Assess patient's baseline mobility status (ambulation, transfers, stairs, etc )    - Identify cognitive and physical deficits and behaviors that affect mobility  - Identify mobility aids required to assist with transfers and/or ambulation (gait belt, sit-to-stand, lift, walker, cane, etc )  - [place] fall precautions as indicated by assessment  - Record patient progress and toleration of activity level on Mobility SBAR; progress patient to next Phase/Stage  - Instruct patient to call for assistance with activity based on assessment  - Consider rehabilitation consult to assist with strengthening/weightbearing, etc   Outcome: Progressing     Problem: DISCHARGE PLANNING  Goal: Discharge to home or other facility with appropriate resources  Description  INTERVENTIONS:  - Identify barriers to discharge w/patient and caregiver  - Arrange for needed discharge resources and transportation as appropriate  - Identify discharge learning needs (meds, wound care, etc )  - Arrange for interpretive services to assist at discharge as needed  - Refer to Case Management Department for coordinating discharge planning if the patient needs post-hospital services based on physician/advanced practitioner order or complex needs related to functional status, cognitive ability, or social support system  Outcome: Progressing     Problem: CARDIOVASCULAR - ADULT  Goal: Maintains optimal cardiac output and hemodynamic stability  Description  INTERVENTIONS:  - Monitor I/O, vital signs and rhythm  - Monitor for S/S and trends of decreased cardiac output  - Administer and titrate ordered vasoactive medications to optimize hemodynamic stability  - Assess quality of pulses, skin color and temperature  - Assess for signs of decreased coronary artery perfusion  - Instruct patient to report change in severity of symptoms  Outcome: Progressing  Goal: Absence of cardiac dysrhythmias or at baseline rhythm  Description  INTERVENTIONS:  - Continuous cardiac monitoring, vital signs, obtain 12 lead EKG if ordered  - Administer antiarrhythmic and heart rate control medications as ordered  - Monitor electrolytes and administer replacement therapy as ordered  Outcome: Progressing     Problem: GASTROINTESTINAL - ADULT  Goal: Minimal or absence of nausea and/or vomiting  Description  INTERVENTIONS:  - Administer IV fluids if ordered to ensure adequate hydration  - Maintain NPO status until nausea and vomiting are resolved  - Nasogastric tube if ordered  - Administer ordered antiemetic medications as needed  - Provide nonpharmacologic comfort measures as appropriate  - Advance diet as tolerated, if ordered  - Consider nutrition services referral to assist patient with adequate nutrition and appropriate food choices  Outcome: Progressing  Goal: Maintains or returns to baseline bowel function  Description  INTERVENTIONS:  - Assess bowel function  - Encourage oral fluids to ensure adequate hydration  - Administer IV fluids if ordered to ensure adequate hydration  - Administer ordered medications as needed  - Encourage mobilization and activity  - Consider nutritional services referral to assist patient with adequate nutrition and appropriate food choices  Outcome: Progressing  Goal: Maintains adequate nutritional intake  Description  INTERVENTIONS:  - Monitor percentage of each meal consumed  - Identify factors contributing to decreased intake, treat as appropriate  - Assist with meals as needed  - Monitor I&O, weight, and lab values if indicated  - Obtain nutrition services referral as needed  Outcome: Progressing     Problem: METABOLIC, FLUID AND ELECTROLYTES - ADULT  Goal: Electrolytes maintained within normal limits  Description  INTERVENTIONS:  - Monitor labs and assess patient for signs and symptoms of electrolyte imbalances  - Administer electrolyte replacement as ordered  - Monitor response to electrolyte replacements, including repeat lab results as appropriate  - Instruct patient on fluid and nutrition as appropriate  Outcome: Progressing  Goal: Fluid balance maintained  Description  INTERVENTIONS:  - Monitor labs   - Monitor I/O and WT  - Instruct patient on fluid and nutrition as appropriate  - Assess for signs & symptoms of volume excess or deficit  Outcome: Progressing

## 2019-08-22 NOTE — H&P
H&P- Alida Rosa [birth year], 80 y o  female MRN: [id number]    Unit/Bed#: E5 -01 Encounter: [id number]    Primary Care Provider: Meg Zamora MD   Date and time admitted to hospital: 8/22/2019  6:08 AM        * Paroxysmal atrial fibrillation ([address] )  Assessment & Plan  · Newly diagnosed with RVR on admission  · Currently back in SR with heart rate in the 90s  · Chads Vasc 2 score of 4 due to age, sex and hypertension  · Discussed anticoagulation with son and daughter were agreeable to start anticoagulation  She has no history of bleeding  She has had normal hemoglobin since 2015 on review of her old record  She lives at [facility] in a monitored setting  She walks with a walker  · At this time, her chads Vasc score supports starting her on anticoagulation for stroke prevention  · Discussed with Cardiology  Will start her on Eliquis 2 5 mg b i d  Hypokalemia  Assessment & Plan  · Secondary to HCTZ  · DC HCTZ  · Potassium supplementation given in the ED  · Repeat labs today at 2:00 p m  And replenish if still low    Dementia  Assessment & Plan  · Patient resides at [facility] in a monitored setting  · Continue dysphagia diet  · Fall precaution  · Full code  · Continue Aricept 10 mg daily  May give lorazepam as needed 0 5 mg every 6 hours for agitation    She was getting this at the home    Temporal [facility])  Assessment & Plan  · Continue prednisone 5 mg daily    GERD (gastroesophageal reflux disease)  Assessment & Plan  · Substitute Protonix for omeprazole    Essential hypertension  Assessment & Plan  · DC HCTZ  · Continue amlodipine and metoprolol with hold parameters    Depression  Assessment & Plan  · Continue Cymbalta 30 mg daily          VTE Prophylaxis: Apixaban (Eliquis)  / sequential compression device   Code Status: full code  POLST: POLST form is completed   Discussion with family:  Spoke with son and daughter/decision makers    Anticipated Length of Stay:  Patient will be admitted on an Inpatient basis with an anticipated length of stay of  at least 2 midnights  Justification for Hospital Stay:  Newly diagnosed Atrial fibrillation and severe hypokalemia    Total Time for Visit, including Counseling / Coordination of Care: 45 minutes  Greater than 50% of this total time spent on direct patient counseling and coordination of care  Chief Complaint:   Epigastric discomfort    History of Present Illness:    Rajesh Longoria is a 80 y o  female with known history of dementia currently residing at [facility] who presents with epigastric discomfort at around 3 in the morning  The patient is a poor historian due to advanced dementia  History is obtained from [facility] records as well as family  At around 3 in the morning she complained of epigastric discomfort and gas  She was given Ativan at around 3:07 a m  In the morning  She received her routine omeprazole at 5:30 a m  In the morning  During her physical exam she was found to have tachycardia  Due to this she was sent to the hospital   In the ER she was found to have atrial fibrillation with rapid ventricular response which is a new diagnosis  She was also found to have a critically low potassium of 2 5  According to her son and her daughter she has had no history of bleeding  She usually walks with a walker  They are [place] witnesses so she will not be able accept blood  At the time of my examination the patient denied palpitations but did admit feeling short of breath when laying flat  She felt better when her bed was adjusted and head of bed was elevated to approximately 30°      Review of Systems:    Review of Systems   Unable to perform ROS: Dementia       Past Medical and Surgical History:     Past Medical History:   Diagnosis Date    Ambulatory dysfunction     Anxiety     Toscano's esophagus     Dementia     Diverticulitis     GERD (gastroesophageal reflux disease)     Hypertension     Pneumonia Past Surgical History:   Procedure Laterality Date    HYSTERECTOMY      TOTAL HIP ARTHROPLASTY         Meds/Allergies:    Prior to Admission medications    Medication Sig Start Date End Date Taking?  Authorizing Provider   albuterol (2 5 mg/3 mL) 0 083 % nebulizer solution Take 2 5 mg by nebulization daily   Yes Historical Provider, MD   amLODIPine (NORVASC) 10 mg tablet Take 10 mg by mouth daily   Yes Historical Provider, MD   bisacodyl (DULCOLAX) 10 mg suppository Insert 10 mg into the rectum   Yes Historical Provider, MD   calcium carbonate (OS-RUPESH) 600 MG tablet Take 600 mg by mouth every 6 (six) hours as needed for heartburn   Yes Historical Provider, MD   donepezil (ARICEPT) 10 mg tablet Take 10 mg by mouth   Yes Historical Provider, MD   DULoxetine (CYMBALTA) 60 mg delayed release capsule Take 30 mg by mouth daily    Yes Historical Provider, MD   hydrochlorothiazide (HYDRODIURIL) 12 5 mg tablet Take 25 mg by mouth   Yes Historical Provider, MD   LORazepam (ATIVAN) 0 5 mg tablet Take 0 5 mg by mouth every 8 (eight) hours as needed for anxiety   Yes Historical Provider, MD   LORazepam (ATIVAN) 0 5 mg tablet lorazepam 0 5 mg tablet   Yes Historical Provider, MD   magnesium hydroxide (MILK OF MAGNESIA) 800 MG/5ML suspension Take 30 mL by mouth daily as needed for constipation   Yes Historical Provider, MD   melatonin 3 mg Take 3 mg by mouth   Yes Historical Provider, MD   metoprolol tartrate (LOPRESSOR) 50 mg tablet Take 50 mg by mouth 2 (two) times a day   Yes Historical Provider, MD   mineral oil enema Insert 1 enema into the rectum daily as needed for constipation   Yes Historical Provider, MD   omeprazole (PriLOSEC) 20 mg delayed release capsule Take 20 mg by mouth daily in the early morning   Yes Historical Provider, MD   predniSONE 5 mg tablet prednisone 5 mg tablet   Yes Historical Provider, MD   amLODIPine (NORVASC) 10 mg tablet amlodipine 10 mg tablet  8/22/19  Historical Provider, MD hornazepril (LOTENSIN) 20 mg tablet Take 20 mg by mouth every 12 (twelve) hours  8/22/19  Historical Provider, MD   benzonatate (TESSALON PERLES) 100 mg capsule Take 1 capsule (100 mg total) by mouth every 8 (eight) hours 7/12/19 8/22/19  Sara Das,    DULoxetine (CYMBALTA) 60 mg delayed release capsule duloxetine 60 mg capsule,delayed release  8/22/19  Historical Provider, MD   gabapentin (NEURONTIN) 100 mg capsule Take 100 mg by mouth 2 (two) times a day  8/22/19  Historical Provider, MD   Menthol, Topical Analgesic, (BIOFREEZE) 4 % GEL Apply topically every 12 (twelve) hours  8/22/19  Historical Provider, MD     I have reveiwed home medications using records provided by [facility]  Allergies: Allergies   Allergen Reactions    Other       Hot pepper    Penicillins     Strawberry C [Ascorbate]     Tylenol [Acetaminophen]        Social History:     Marital Status: /Civil Union   Occupation:  None  Patient Pre-hospital Living Situation:  Lives at [facility]  Patient Pre-hospital Level of Mobility:  Walks with walker  Patient Pre-hospital Diet Restrictions:  Dysphagia diet  Substance Use History:   Social History     Substance and Sexual Activity   Alcohol Use No    Alcohol/week: 1 0 standard drinks    Types: 1 Glasses of wine per week     Social History     Tobacco Use   Smoking Status Former Smoker   Smokeless Tobacco Never Used     Social History     Substance and Sexual Activity   Drug Use No       Family History:    Patient's mother had heart disease    Physical Exam:     Vitals:   Blood Pressure: 122/68 (08/22/19 0925)  Pulse: 89 (08/22/19 0925)  Temperature: (!) 96 2 °F (35 7 °C) (08/22/19 0925)  Temp Source: Temporal (08/22/19 0925)  Respirations: 18 (08/22/19 0925)  Height: 5' 3" (160 cm) (08/22/19 0925)  Weight - Scale: 76 2 kg (167 lb 15 9 oz) (08/22/19 0925)  SpO2: 96 % (08/22/19 0925)    Physical Exam   Constitutional: She appears well-developed  No distress     HENT:   Head: Normocephalic and atraumatic  Eyes: No scleral icterus  Neck: No JVD present  Cardiovascular: Normal rate  Pulmonary/Chest: Effort normal  No stridor  No respiratory distress  She has no wheezes  Abdominal: Soft  She exhibits no distension  There is no tenderness  There is no guarding  Musculoskeletal: She exhibits no edema  Neurological: She is alert  Skin: Skin is warm and dry  She is not diaphoretic  Psychiatric: She has a normal mood and affect  Her behavior is normal        Additional Data:     Lab Results: I have personally reviewed pertinent reports  Results from last 7 days   Lab Units 08/22/19  0623   WBC Thousand/uL 10 78*   HEMOGLOBIN g/dL 13 0   HEMATOCRIT % 38 8   PLATELETS Thousands/uL 231   NEUTROS PCT % 62   LYMPHS PCT % 21   MONOS PCT % 8   EOS PCT % 7*     Results from last 7 days   Lab Units 08/22/19  0623   SODIUM mmol/L 138   POTASSIUM mmol/L 2 5*   CHLORIDE mmol/L 100   CO2 mmol/L 30   BUN mg/dL 10   CREATININE mg/dL 0 84   ANION GAP mmol/L 8   CALCIUM mg/dL 9 4   ALBUMIN g/dL 3 2*   TOTAL BILIRUBIN mg/dL 0 22   ALK PHOS U/L 61   ALT U/L 17   AST U/L 15   GLUCOSE RANDOM mg/dL 179*                       Imaging: I have personally reviewed pertinent reports  XR chest 1 view portable   Final Result by King Kat MD (08/22 0945)      No acute cardiopulmonary disease  Abnormal left shoulder joint  Consider dedicated shoulder imaging on a nonemergent basis if deemed clinically appropriate for this patient  Workstation performed: [id number]             EKG, Pathology, and Other Studies Reviewed on Admission:   · EKG:  EKG with AFib in RVR    Allscripts / Epic Records Reviewed: Yes     ** Please Note: This note has been constructed using a voice recognition system   **

## 2019-08-22 NOTE — ASSESSMENT & PLAN NOTE
· Secondary to HCTZ  · DC HCTZ  · Potassium supplementation given in the ED  · Repeat labs today at 2:00 p m   And replenish if still low

## 2019-08-23 NOTE — PHYSICAL THERAPY NOTE
PHYSICAL THERAPY NOTE    PT EVALUATION    80 y o     [id number]    Hypokalemia [E87 6]  Abdominal pain [R10 9]  Atrial fibrillation with RVR (HCC) [I48 91]    Past Medical History:   Diagnosis Date    Ambulatory dysfunction     Anxiety     Toscano's esophagus     Dementia     Diverticulitis     GERD (gastroesophageal reflux disease)     Hypertension     Pneumonia          Past Surgical History:   Procedure Laterality Date    HYSTERECTOMY      TOTAL HIP ARTHROPLASTY             Patient Name: Clair CARRERA Date: 8/23/2019 08/23/19 1410   Note Type   Note type Eval only   Pain Assessment   Pain Assessment No/denies pain   Home Living   Type of Home SNF  [facility])   Home Layout One level   Home Equipment Other (Comment)  (rollator walker)   Additional Comments resides at [facility]  Prior Function   Level of [place] Independent with ADLs and functional mobility; Needs assistance with ADLs and functional mobility  (A for showering  Amb iwht rollator I)   Lives With Facility staff   Receives Help From Personal care attendant   ADL Assistance Needs assistance   IADLs Needs assistance   Falls in the last 6 months 0   Comments I with use of rollator walker  Restrictions/Precautions   Other Precautions Cognitive; Chair Alarm; Bed Alarm; Fall Risk;Telemetry   General   Additional Pertinent History Pt is 79 y/o female admitted with hypokalemia and afib  PT consulted  Activity as tolerated per Dr Evaristo Ayala  Family/Caregiver Present Yes   Cognition   Orientation Level Oriented to person   Comments dementia at bseline     RUE Assessment   RUE Assessment WFL   LUE Assessment   LUE Assessment WFL   RLE Assessment   RLE Assessment WFL   LLE Assessment   LLE Assessment WFL   Bed Mobility   Supine to Sit 5  Supervision   Additional items Increased time required;HOB elevated   Sit to Supine 5  Supervision Additional items Increased time required;HOB elevated   Transfers   Sit to Stand 5  Supervision   Additional items Increased time required;Verbal cues   Stand to Sit 5  Supervision   Additional items Increased time required;Verbal cues   Additional Comments cues for safe hand placement   Ambulation/Elevation   Gait pattern Decreased foot clearance;Narrow RADHA; Inconsistent lilibeth; Short stride   Gait Assistance 5  Supervision  (close S)   Additional items Assist x 1;Verbal cues   Assistive Device Rolling walker   Distance Amb with 'x2 with seated rest    Balance   Static Sitting Good   Dynamic Sitting Fair +   Static Standing Fair   Dynamic Standing Fair -   Ambulatory Fair -   Endurance Deficit   Endurance Deficit Yes   Endurance Deficit Description fatigue  Activity Tolerance   Activity Tolerance Patient tolerated treatment well;Patient limited by fatigue   Medical Staff Made Aware NurseArnaud   Nurse Made Aware yes   Assessment   Prognosis Fair   Problem List Decreased endurance; Impaired balance;Decreased mobility; Decreased cognition; Impaired judgement;Decreased safety awareness   Assessment Pt is 79 y/o female with hx of dementia, presents with new onset Afib, hypokalemia  PT consulted  Activity as tolerated per Dr Vaughn Fuentes  At baseline resides at [facility] and ambulates with rollator walker  Currently presents with mild decline from baseline with decreased activity tolerance as noted with fatigue  Cues needed for safety with mobility with hx of cognitive impairment  Increased risk for falls 2* same  At this time forsee no skilled IPPT needs but would rec PT upon return to SNF to optimize activity tolerance, balance and reduce risk for falls  Will d/c PT and advise restorative ambulation with [facility] staff to prevent further functional decline with hospitalization  D/C PT  Goals   Patient Goals go back to [facility]  Plan   Treatment/Interventions Patient/family training;Equipment eval/education; Bed mobility;Gait training; Compensatory technique education;Continued evaluation;Spoke to nursing   PT Frequency One time visit  (PT daniela and d/c)   Recommendation   Recommendation   [facility] )   PT - OK to Discharge Yes   Barthel Index   Feeding 10   Bathing 0   Grooming Score 5   Dressing Score 5   Bladder Score 5   Bowels Score 10   Toilet Use Score 5   Transfers (Bed/Chair) Score 10   Mobility (Level Surface) Score 10   Stairs Score 0   Barthel Index Score 60   History: co - morbidities, fall risk, use of assistive device, assist for adl's, cognition, multiple lines  Exam: impairments in locomotion, musculoskeletal, balance,posture,  Cardiac,  cognition   Clinical: unstable/unpredictable  Complexity:high    Linda Arambula, PT

## 2019-08-23 NOTE — ASSESSMENT & PLAN NOTE
· Secondary to HCTZ  · DC HCTZ  · With persistent hypokalemia  · Patient did not tolerate IV potassium repletion  · Start K Dur 40 mEq b i d  P o

## 2019-08-23 NOTE — ASSESSMENT & PLAN NOTE
· Newly diagnosed with RVR on admission  · Currently back in SR Chads Vasc 2 score of 4 due to age, sex and hypertension  · Discussed anticoagulation with son and daughter and work agreeable to start anticoagulation  She has no history of bleeding  She has had normal hemoglobin since 2015 on review of her old record  She lives at [facility] in a monitored setting  She walks with a walker  · Discussed risk/benefits of anticoagulation again with daughter Kim M[place]  She knows about risk of bleeding and given that she is a Islam she would not be able to receive blood products  Given that the comfortable with continuing anticoagulation for primary stroke prevention given her high Jamil score  · At this time, her chads Vasc score supports starting her on anticoagulation for stroke prevention    · Continue on Eliquis 2 5 mg b i d   · Consult PT OT  · Await echo

## 2019-08-23 NOTE — PROGRESS NOTES
Progress Note - Jad Mohamud 0/42/2099, 80 y o  female MRN: [id number]    Unit/Bed#: E5 -01 Encounter: [id number]    Primary Care Provider: Kylah Sears MD   Date and time admitted to hospital: 8/22/2019  6:08 AM        * Paroxysmal atrial fibrillation [facility])  Assessment & Plan  · Newly diagnosed with RVR on admission  · Currently back in SR Chads Vasc 2 score of 4 due to age, sex and hypertension  · Discussed anticoagulation with son and daughter and work agreeable to start anticoagulation  She has no history of bleeding  She has had normal hemoglobin since 2015 on review of her old record  She lives at [facility] in a monitored setting  She walks with a walker  · Discussed risk/benefits of anticoagulation again with daughter Triston First  She knows about risk of bleeding and given that she is a Mosque she would not be able to receive blood products  Given that the comfortable with continuing anticoagulation for primary stroke prevention given her high Jamil score  · At this time, her chads Vasc score supports starting her on anticoagulation for stroke prevention  · Continue on Eliquis 2 5 mg b i d   · Consult PT OT  · Await echo    Hypokalemia  Assessment & Plan  · Secondary to HCTZ  · DC HCTZ  · With persistent hypokalemia  · Patient did not tolerate IV potassium repletion  · Start K Dur 40 mEq b i d  P o  Dementia  Assessment & Plan  · Patient resides at [facility] in a monitored setting  · Continue dysphagia diet  · Fall precaution  · Full code  · Continue Aricept 10 mg daily  May give lorazepam as needed 0 5 mg every 6 hours for agitation    She was getting this at the home    Temporal arteritis [facility])  Assessment & Plan  · Continue prednisone 5 mg daily    GERD (gastroesophageal reflux disease)  Assessment & Plan  · Substitute Protonix for omeprazole    Essential hypertension  Assessment & Plan  · DC HCTZ  · Continue amlodipine and metoprolol with hold parameters    Depression  Assessment & Plan  · Continue Cymbalta 30 mg daily      VTE Pharmacologic Prophylaxis:   Pharmacologic: Apixaban (Eliquis)  Mechanical VTE Prophylaxis in Place: No    Patient Centered Rounds: I have performed bedside rounds with nursing staff today  Education and Discussions with Family / Patient:  Spoke with daughter and gave update    Time Spent for Care: 20 minutes  More than 50% of total time spent on counseling and coordination of care as described above  Current Length of Stay: 1 day(s)    Current Patient Status: Inpatient   Certification Statement: The patient will continue to require additional inpatient hospital stay due to AFib    Discharge Plan:  In 24 hours back to [address] [place] [address] Status: Level 1 - Full Code      Subjective:   Poor historian  Could not recall our conversation yesterday  Denies palpitation or epigastric discomfort  Baseline has dementia    Objective:     Vitals:   Temp (24hrs), Av 3 °F (36 3 °C), Min:96 2 °F (35 7 °C), Max:98 1 °F (36 7 °C)    Temp:  [96 2 °F (35 7 °C)-98 1 °F (36 7 °C)] 97 9 °F (36 6 °C)  HR:  [71-89] 74  Resp:  [18] 18  BP: (122-155)/(68-74) 138/74  SpO2:  [94 %-96 %] 95 %  Body mass index is 29 76 kg/m²  Input and Output Summary (last 24 hours): Intake/Output Summary (Last 24 hours) at 2019 0912  Last data filed at 2019 1000  Gross per 24 hour   Intake    Output 300 ml   Net -300 ml       Physical Exam:     Physical Exam   Constitutional: She appears well-developed  No distress  HENT:   Head: Normocephalic and atraumatic  Eyes: No scleral icterus  Neck: No JVD present  Cardiovascular: Normal rate  Exam reveals no gallop and no friction rub  No murmur heard  Pulmonary/Chest: Effort normal  No stridor  No respiratory distress  She has no wheezes  Abdominal: Soft  She exhibits no distension  There is no tenderness  There is no guarding  Musculoskeletal: She exhibits no edema or deformity  Neurological: She is alert  Poor recall  Ambulatory with minimal supervision   Skin: Skin is warm and dry  She is not diaphoretic  Psychiatric: She has a normal mood and affect  Additional Data:     Labs:    Results from last 7 days   Lab Units 08/23/19  0521 08/22/19 0623   WBC Thousand/uL 8 87 10 78*   HEMOGLOBIN g/dL 12 4 13 0   HEMATOCRIT % 37 6 38 8   PLATELETS Thousands/uL 221 231   NEUTROS PCT %  --  62   LYMPHS PCT %  --  21   MONOS PCT %  --  8   EOS PCT %  --  7*     Results from last 7 days   Lab Units 08/23/19  0521  08/22/19  0623   SODIUM mmol/L 144  --  138   POTASSIUM mmol/L 2 9*   < > 2 5*   CHLORIDE mmol/L 106  --  100   CO2 mmol/L 29  --  30   BUN mg/dL 7  --  10   CREATININE mg/dL 0 64  --  0 84   ANION GAP mmol/L 9  --  8   CALCIUM mg/dL 9 2  --  9 4   ALBUMIN g/dL  --   --  3 2*   TOTAL BILIRUBIN mg/dL  --   --  0 22   ALK PHOS U/L  --   --  61   ALT U/L  --   --  17   AST U/L  --   --  15   GLUCOSE RANDOM mg/dL 109  --  179*    < > = values in this interval not displayed  * I Have Reviewed All Lab Data Listed Above  * Additional Pertinent Lab Tests Reviewed:  All Labs Within Last 24 Hours Reviewed    Imaging:    Imaging Reports Reviewed Today Include:  None  Recent Cultures (last 7 days):           Last 24 Hours Medication List:     Current Facility-Administered Medications:  amLODIPine 10 mg Oral Daily Raffi Holm MD   apixaban 2 5 mg Oral BID Raffi Holm MD   donepezil 10 mg Oral HS Raffi Holm MD   DULoxetine 30 mg Oral Daily Raffi Holm MD   LORazepam 0 5 mg Oral Q8H PRN Raffi Holm MD   melatonin 3 mg Oral HS Raffi Holm MD   metoprolol tartrate 50 mg Oral Q12H [address] Raffi Holm MD   pantoprazole 40 mg Oral Daily Before Breakfast Raffi Holm MD   potassium chloride 40 mEq Oral BID Raffi Holm MD   predniSONE 5 mg Oral Daily Raffi Holm MD        Today, Patient Was Seen By: Raffi Holm MD    ** Please Note: Dictation voice to text software may have been used in the creation of this document   **

## 2019-08-23 NOTE — CASE MANAGEMENT
CM met with patient to introduce self  CM called and left message for daughter Venus Sharp giving her weekend 's number [phone] regarding possible discharge back to [facility] tomorrow  CM called and spoke to [facility] at [facility] as well as placed referral back to [facility]  Pt lives at [facility] and is a MA bed hold  Patient is currently on D6, locked unit in private room for previously expressing intention to harm self but has adjusted well without further or SI  Per staff she is a (S) with ADL's requiring VC's for thoroughness and sequencing  She ambulates in room with rollator however requires a w/c transport for longer distances  POA of healthcare and financials is daughter Venus Sharp and copy of living will and POA is on chart  Pt's PCP is Dr Uma Mireles and medications are provided through [facility]  Phone number for report is [phone], F [phone]; placed in facility contacts  Patient will need BLS transport arranged back to [facility] due to dementia and need for medical attendant  CM department will continue to follow through discharge

## 2019-08-23 NOTE — ASSESSMENT & PLAN NOTE
· Patient resides at [facility] in a monitored setting  · Continue dysphagia diet  · Fall precaution  · Full code  · Continue Aricept 10 mg daily  May give lorazepam as needed 0 5 mg every 6 hours for agitation    She was getting this at the home

## 2019-08-23 NOTE — PROGRESS NOTES
Progress Note - Cardiology   Livia Umana 80 y o  female MRN: [id number]  Unit/Bed#: E5 -01 Encounter: [id number]          Asessment/Plan:  1  Atrial fibrillation:  New diagnosis  Currently sinus rhythm  2  Hypokalemia (present on admission):  Presenting potassium 2 5 now improved though still abnormal at 2 9  3  Hypertension:  Controlled  4  History temporal arteritis:  Receiving prednisone  5  Dementia  6  Depression    · The patient has been initiated on oral anticoagulation-Eliquis  With some trepidation related to her risk:Benefit ratio she was placed on 2 5 mg b i d  (usual dose will be 5 mg b i d  As she is over [address], but her weight is above 60 kg and creatinine below 1 5)  · Continue rate control strategy with AV blocking Rx-metoprolol  For future recurrence would give consideration to antiarrhythmic therapy  · Potassium being repleted with combination of IV and oral replacement  · Patient seems stable for discharge from cardiology perspective  If she remains in the hospital we will see her on an as-needed -- please call for any interval change  Subjective:  Alert and interactive though confused  She denies any pain, ectopy, or dyspnea      Vitals:  Vitals:    08/22/19 0925   Weight: 76 2 kg (167 lb 15 9 oz)   ,  Vitals:    08/22/19 0925 08/22/19 1529 08/22/19 2248 08/23/19 0700   BP: 122/68 155/72 142/70 138/74   BP Location:  Right arm Right arm Right arm   Pulse: 89 71 71 74   Resp: 18 18 18 18   Temp: (!) 96 2 °F (35 7 °C) 98 1 °F (36 7 °C) (!) 96 8 °F (36 °C) 97 9 °F (36 6 °C)   TempSrc: Temporal Tympanic Temporal Tympanic   SpO2: 96% 95% 94% 95%   Weight: 76 2 kg (167 lb 15 9 oz)      Height: 5' 3" (1 6 m)          Exam:  General:  Appears comfortable while lying flat    Though confused she is normally conversant and cooperative  Heart:  Regular with controlled rate  Lungs:  Clear           Telemetry:        Normal sinus rhythm with heart rate in the 70s    Medications: Current Facility-Administered Medications:     amLODIPine (NORVASC) tablet 10 mg, 10 mg, Oral, Daily, Sara Kong MD, 10 mg at 08/23/19 0954    apixaban (ELIQUIS) tablet 2 5 mg, 2 5 mg, Oral, BID, Sara Kong MD, 2 5 mg at 08/23/19 0954    donepezil (ARICEPT) tablet 10 mg, 10 mg, Oral, HS, Sara Kong MD, 10 mg at 08/22/19 2153    DULoxetine (CYMBALTA) delayed release capsule 30 mg, 30 mg, Oral, Daily, Sara Kong MD, 30 mg at 08/23/19 0954    LORazepam (ATIVAN) tablet 0 5 mg, 0 5 mg, Oral, Q8H PRN, Sara Kong MD, 0 5 mg at 08/22/19 2153    melatonin tablet 3 mg, 3 mg, Oral, HS, Sara Kong MD, 3 mg at 08/22/19 2153    metoprolol tartrate (LOPRESSOR) tablet 50 mg, 50 mg, Oral, Q12H [address], Sara Kong MD, 50 mg at 08/23/19 0954    pantoprazole (PROTONIX) EC tablet 40 mg, 40 mg, Oral, Daily Before Breakfast, Sara Kong MD, 40 mg at 08/23/19 0606    potassium chloride (K-DUR,KLOR-CON) CR tablet 40 mEq, 40 mEq, Oral, BID, Sara Kong MD, 40 mEq at 08/23/19 0955    predniSONE tablet 5 mg, 5 mg, Oral, Daily, Sara Kong MD, 5 mg at 08/23/19 0954      Labs/Data:        Results from last 7 days   Lab Units 08/23/19  0521 08/22/19  0623   WBC Thousand/uL 8 87 10 78*   HEMOGLOBIN g/dL 12 4 13 0   HEMATOCRIT % 37 6 38 8   PLATELETS Thousands/uL 221 231     Results from last 7 days   Lab Units 08/23/19  0521 08/22/19  1511 08/22/19  0623   POTASSIUM mmol/L 2 9* 2 9* 2 5*   CHLORIDE mmol/L 106  --  100   CO2 mmol/L 29  --  30   BUN mg/dL 7  --  10   TROPONIN I ng/mL  --   --  <0 02

## 2019-08-24 PROBLEM — E87.6 HYPOKALEMIA: Status: RESOLVED | Noted: 2019-01-01 | Resolved: 2019-01-01

## 2019-08-24 NOTE — PROGRESS NOTES
Progress Note - Talon Tanner [birth year], 80 y o  female MRN: [id number]    Unit/Bed#: E5 -01 Encounter: [id number]    Primary Care Provider: Suzi Crawley MD   Date and time admitted to hospital: 8/22/2019  6:08 AM        * Paroxysmal atrial fibrillation [facility])  Assessment & Plan  · Newly diagnosed with RVR on admission  · Currently back in SR Chads Vasc 2 score of 4 due to age, sex and hypertension  · Discussed anticoagulation with son and daughter and work agreeable to start anticoagulation  She has no history of bleeding  She has had normal hemoglobin since 2015 on review of her old record  She lives at [facility] in a monitored setting  She walks with a walker  · Discussed risk/benefits of anticoagulation again with daughter Onur Nelson  She knows about risk of bleeding and given that she is a Yazidi she would not be able to receive blood products  Given that the comfortable with continuing anticoagulation for primary stroke prevention given her high Jamil score  · At this time, her chads Vasc score supports starting her on anticoagulation for stroke prevention  · Continue on Eliquis 2 5 mg b i d   · Stable for DC   · Echo pending  OP follow-up with Cardiology  Hypokalemia  Assessment & Plan  · Secondary to HCTZ  · DC HCTZ  · Potassium repleted  · Resolved    Dementia  Assessment & Plan  · Patient resides at [facility] in a monitored setting  · Continue dysphagia diet  · Fall precaution  · Full code  · Continue Aricept 10 mg daily  May give lorazepam as needed 0 5 mg every 6 hours for agitation    She was getting this at the home    Temporal arteritis [facility])  Assessment & Plan  · Continue prednisone 5 mg daily    GERD (gastroesophageal reflux disease)  Assessment & Plan  · Substitute Protonix for omeprazole while in house  · Resume omeprazole on DC    Essential hypertension  Assessment & Plan  · DC HCTZ  · Continue amlodipine and metoprolol with hold parameters    Depression  Assessment & Plan  · Continue Cymbalta 30 mg daily        VTE Pharmacologic Prophylaxis:   Pharmacologic: Apixaban (Eliquis)  Mechanical VTE Prophylaxis in Place: No    Patient Centered Rounds: I have performed bedside rounds with nursing staff today  Discussions with Specialists or Other Care Team Provider:  Case management    Education and Discussions with Family / Patient:  Daughter said who requested transportation  She could not transfer of her mom today    Time Spent for Care: 20 minutes  More than 50% of total time spent on counseling and coordination of care as described above  Current Length of Stay: 2 day(s)    Current Patient Status: Inpatient   Certification Statement: The patient will continue to require additional inpatient hospital stay due to AFib    Discharge Plan:  Anticipate discharge today once transportation is arranged    Code Status: Level 1 - Full Code      Subjective:   Patient has poor recall of recent events due to dementia  No bleeding since starting Eliquis  The patient was thankful for her care    Objective:     Vitals:   Temp (24hrs), Av 3 °F (36 3 °C), Min:96 4 °F (35 8 °C), Max:98 °F (36 7 °C)    Temp:  [96 4 °F (35 8 °C)-98 °F (36 7 °C)] 98 °F (36 7 °C)  HR:  [81-96] 84  Resp:  [18] 18  BP: (139-164)/(67-74) 148/72  SpO2:  [93 %-96 %] 95 %  Body mass index is 29 76 kg/m²  Input and Output Summary (last 24 hours): Intake/Output Summary (Last 24 hours) at 2019 0944  Last data filed at 2019 1100  Gross per 24 hour   Intake 480 ml   Output    Net 480 ml       Physical Exam:     Physical Exam   Constitutional: She appears well-developed  No distress  HENT:   Head: Normocephalic and atraumatic  Eyes: No scleral icterus  Neck: No JVD present  Cardiovascular: Normal rate  Exam reveals no gallop and no friction rub  No murmur heard  Pulmonary/Chest: Effort normal  No stridor  No respiratory distress  She has no wheezes  Abdominal: Soft  She exhibits no distension  There is no tenderness  There is no guarding  Musculoskeletal: She exhibits deformity  She exhibits no edema  Neurological: She is alert  Skin: Skin is warm and dry  She is not diaphoretic  Psychiatric: She has a normal mood and affect  Additional Data:     Labs:    Results from last 7 days   Lab Units 08/24/19  0544   WBC Thousand/uL 8 98   HEMOGLOBIN g/dL 12 2   HEMATOCRIT % 38 8   PLATELETS Thousands/uL 235   NEUTROS PCT % 58   LYMPHS PCT % 22   MONOS PCT % 9   EOS PCT % 9*     Results from last 7 days   Lab Units 08/24/19  0544  08/22/19  0623   SODIUM mmol/L 141   < > 138   POTASSIUM mmol/L 4 1   < > 2 5*   CHLORIDE mmol/L 106   < > 100   CO2 mmol/L 27   < > 30   BUN mg/dL 7   < > 10   CREATININE mg/dL 0 70   < > 0 84   ANION GAP mmol/L 8   < > 8   CALCIUM mg/dL 9 2   < > 9 4   ALBUMIN g/dL  --   --  3 2*   TOTAL BILIRUBIN mg/dL  --   --  0 22   ALK PHOS U/L  --   --  61   ALT U/L  --   --  17   AST U/L  --   --  15   GLUCOSE RANDOM mg/dL 127   < > 179*    < > = values in this interval not displayed  * I Have Reviewed All Lab Data Listed Above  * Additional Pertinent Lab Tests Reviewed:  All Labs Within Last 24 Hours Reviewed    Imaging:    Imaging Reports Reviewed Today Include:  None  Recent Cultures (last 7 days):           Last 24 Hours Medication List:     Current Facility-Administered Medications:  amLODIPine 10 mg Oral Daily Kala Barclay MD   apixaban 2 5 mg Oral BID Kala Barclay MD   donepezil 10 mg Oral HS Kala Barclay MD   DULoxetine 30 mg Oral Daily Kala Barclay MD   LORazepam 0 5 mg Oral Q8H PRN Kala Barclay MD   melatonin 3 mg Oral HS Kala Barclay MD   metoprolol tartrate 50 mg Oral Q12H [address] Kala Barclay MD   pantoprazole 40 mg Oral Daily Before Breakfast Kala Barclay MD   predniSONE 5 mg Oral Daily Kala Barclay MD        Today, Patient Was Seen By: Kala Barclay MD    ** Please Note: Dictation voice to text software may have been used in the creation of this document   **

## 2019-08-24 NOTE — INCIDENTAL FINDINGS
The following findings require follow up:  Radiographic finding   Finding: degenerative left shoulder lucencies   Follow up required: shoulder xray   Follow up should be done within 1 month(s)    Please notify the following clinician to assist with the follow up:   Dr Jacy Bates

## 2019-08-24 NOTE — DISCHARGE SUMMARY
Discharge- Hope Russo [birth year], 80 y o  female MRN: [id number]    Unit/Bed#: E5 -01 Encounter: [id number]    Primary Care Provider: Jim Parker MD   Date and time admitted to hospital: 8/22/2019  6:08 AM        * Paroxysmal atrial fibrillation [facility])  Assessment & Plan  · Newly diagnosed with RVR on admission  · Rate controlled and paroxysmal  · Chads Vasc 2 score of 4 due to age, sex and hypertension  · Discussed anticoagulation with son and daughter and work agreeable to start anticoagulation  She has no history of bleeding  She has had normal hemoglobin since 2015 on review of her old record  She lives at [facility] in a monitored setting  She walks with a walker  · Discussed risk/benefits of anticoagulation again with daughter Laura Stinson  She knows about risk of bleeding and given that she is a Taoism she would not be able to receive blood products  Given that the comfortable with continuing anticoagulation for primary stroke prevention given her high Jamil score  · At this time, her chads Vasc score supports starting her on anticoagulation for stroke prevention  · Continue on Eliquis 2 5 mg b i d   · Stable for DC   · Echo with hyperdynamic EF 75%    Hypokalemiaresolved as of 8/24/2019  Assessment & Plan  · Secondary to HCTZ  · DC HCTZ  · Potassium repleted  · Resolved    Dementia  Assessment & Plan  · Patient resides at [facility] in a monitored setting  · Continue dysphagia diet  · Fall precaution  · Full code  · Continue Aricept 10 mg daily        Temporal arteritis (HCC)  Assessment & Plan  · Continue prednisone 5 mg daily    GERD (gastroesophageal reflux disease)  Assessment & Plan  · Resume omeprazole on DC    Essential hypertension  Assessment & Plan  · DC HCTZ  · Continue amlodipine and metoprolol with hold parameters        Discharging Physician / Practitioner: Yessenia Garcia MD  PCP: Jim Parker MD  Admission Date:   Admission Orders (From admission, onward) Ordered        08/22/19 1044  Inpatient Admission  Once         08/22/19 0614  Place in Observation  Once                   Discharge Date: 08/24/19    Resolved Problems  Date Reviewed: 8/24/2019          Resolved    Hypokalemia 8/24/2019     Resolved by  Daniel Snider MD          Consultations During Hospital Stay:  · cardiology    Procedures Performed:   · none    Significant Findings / Test Results:   · PAF    Incidental Findings:   CXR -There are some lucencies seen within the left humeral head which might be degenerative geodes as there seems to be some narrowing of the joint space and sclerosis of the inferior margin of the glenoid  Other types of cystic bone lesions are not excluded  Test Results Pending at Discharge (will require follow up):   · none     Outpatient Tests Requested:  · none    Complications:  none    Reason for Admission: atrial fibrillation    Hospital Course:     Xena Aponte is a 80 y o  female patient who originally presented to the hospital on 8/22/2019 due to newly diagnosed atrial fibrillation  She was evaluated at around 3 in the morning due to epigastric discomfort and was found to have irregular tachycardia and was then sent to the hospital   In the ER she was found to have atrial fibrillation with RVR  In addition she was also have severe hypokalemia  This is reverted back to normal sinus rhythm and she was subsequently admitted for further evaluation and treatment  She was seen by Cardiology and Internal Medicine  Due to her age, sex, comorbidities, her Kalina score was 4  She was started on Eliquis 2 5 mg b i d     A lower dose was decided due to her dementia and being a [employer]  Thorough discussion was done with the patient's daughter and son they were both agreeable with anticoagulation  They were aware of the risk of bleeding    However at this time given that her mom has had no history of and has had stable hemoglobin since 2015, the benefit of anticoagulation outweighs the risk  She tolerated Eliquis  Her heart rate was controlled with metoprolol  Hypokalemia was secondary to HCTZ which was discontinued  Her potassium was repleted until normal  HCTZ was discontinued from her discharge medications  She will be transferred back to [facility] today in stable condition      Please see above list of diagnoses and related plan for additional information  Condition at Discharge: good     Discharge Day Visit / Exam:     * Please refer to separate progress note for these details *    Discussion with Family:  Spoke with daughter earlier today    Discharge instructions/Information to patient and family:   See after visit summary for information provided to patient and family  Provisions for Follow-Up Care:  See after visit summary for information related to follow-up care and any pertinent home health orders  Disposition:     Other [place] [facility] Readmission: no     Discharge Statement:  I spent >30 minutes discharging the patient  This time was spent on the day of discharge  I had direct contact with the patient on the day of discharge  Greater than 50% of the total time was spent examining patient, answering all patient questions, arranging and discussing plan of care with patient as well as directly providing post-discharge instructions  Additional time then spent on discharge activities  Discharge Medications:  See after visit summary for reconciled discharge medications provided to patient and family        ** Please Note: This note has been constructed using a voice recognition system **

## 2019-08-24 NOTE — ASSESSMENT & PLAN NOTE
· Newly diagnosed with RVR on admission  · Currently back in SR Chads Vasc 2 score of 4 due to age, sex and hypertension  · Discussed anticoagulation with son and daughter and work agreeable to start anticoagulation  She has no history of bleeding  She has had normal hemoglobin since 2015 on review of her old record  She lives at [facility] in a monitored setting  She walks with a walker  · Discussed risk/benefits of anticoagulation again with daughter Nora Carrasco  She knows about risk of bleeding and given that she is a [employer] she would not be able to receive blood products  Given that the comfortable with continuing anticoagulation for primary stroke prevention given her high Jamil score  · At this time, her chads Vasc score supports starting her on anticoagulation for stroke prevention  · Continue on Eliquis 2 5 mg b i d   · Stable for DC   · Echo pending but this will not change her management  This can be done as an outpatient at follow-up with Cardiology

## 2019-08-24 NOTE — ASSESSMENT & PLAN NOTE
· Newly diagnosed with RVR on admission  · Rate controlled and paroxysmal  · Chads Vasc 2 score of 4 due to age, sex and hypertension  · Discussed anticoagulation with son and daughter and work agreeable to start anticoagulation  She has no history of bleeding  She has had normal hemoglobin since 2015 on review of her old record  She lives at [facility] in a monitored setting  She walks with a walker  · Discussed risk/benefits of anticoagulation again with daughter Angela Elaine  She knows about risk of bleeding and given that she is a Taoism she would not be able to receive blood products  Given that the comfortable with continuing anticoagulation for primary stroke prevention given her high Jamil score  · At this time, her chads Vasc score supports starting her on anticoagulation for stroke prevention    · Continue on Eliquis 2 5 mg b i d   · Stable for DC   · Echo with hyperdynamic EF 75%

## 2019-08-24 NOTE — ASSESSMENT & PLAN NOTE
· Patient resides at [facility] in a monitored setting  · Continue dysphagia diet  · Fall precaution  · Full code  · Continue Aricept 10 mg daily  May give lorazepam as needed 0 5 mg every 6 hours for agitation    She was getting this at the home

## 2019-08-24 NOTE — ASSESSMENT & PLAN NOTE
· Patient resides at [facility] in a monitored setting  · Continue dysphagia diet  · Fall precaution  · Full code  · Continue Aricept 10 mg daily

## 2019-08-24 NOTE — DISCHARGE INSTRUCTIONS
NEW MED: eliquis 2 5 mg twice daily for AFIB  STOP MED: HCTZ  Follow up with cardiology in 2 weeks  Return to the ER if she develops any form of persistent bleeding

## 2019-08-24 NOTE — SOCIAL WORK
Attending made CM aware that patient would be cleared for discharge  A BLS transport, d/t dementia, contact precautions and falls risk was scheduled with [email or web address] for 2:30 today  CMN form in chart; copy made for MR bin  Numbers for report in EPIC     No IMM required as pt is only here 2 days        Updated: attending, RN, family and facility via ABBY Arellano  8/24/2019  14:35

## 2019-08-24 NOTE — RAPID RESPONSE
Progress Note - Rapid Response   Tin Lau 80 y o  female MRN: [id number]    Time Called ( Time): 11:01  Date Called: 8/24/19  Level of Care: MS  Room#: 316  IPYQBDV Time ( Time): 4411  Event End Time ( Time): 4013  Primary reason for call: Acute change in RR and Acute change in mental status  Interventions:  Airway/Breathing:  O2 Mask/Nasal  Circulation: N/A  Other Treatments: N/A       Assessment:   1  Paroxysmal atrial fibrillation  2  Dementia  3  Hypokalemia    Plan:   · Rapid response was called secondary to acute episode of confusion  No interventions were performed  Patient was subsequently discharged back to her rehab  HPI/Chief Complaint (Background/Situation):   Tin Lau is a 80y o  year old female who presents with paroxysmal atrial fibrillation  She was treated by Medicine and converted back to normal sinus rhythm  This morning the patient had an episode of acute confusion and felt as though she was unable to breathe  Saturation of peripheral oxygen was 100% on room air  Despite this, she was placed on a non-rebreather mask with oxygen in an effort to relieve her sense of shortness of breath  Patient denied cough, chest pain, sputum production, back pain  Once the patient was distracted from her initial complaint her breathing became more rhythmic and normal   She no longer complained of shortness of breath  It appeared this was a acute episode of confusion but anxiety  Patient was subsequently discharged home to her rehab      Historical Information   Past Medical History:   Diagnosis Date    Ambulatory dysfunction     Anxiety     Toscano's esophagus     Dementia     Diverticulitis     GERD (gastroesophageal reflux disease)     Hypertension     Pneumonia      Past Surgical History:   Procedure Laterality Date    HYSTERECTOMY      TOTAL HIP ARTHROPLASTY       Social History   Social History     Substance and Sexual Activity Alcohol Use No    Alcohol/week: 1 0 standard drinks    Types: 1 Glasses of wine per week     Social History     Substance and Sexual Activity   Drug Use No     Social History     Tobacco Use   Smoking Status Former Smoker   Smokeless Tobacco Never Used     Family History: non-contributory    Meds/Allergies     Current Facility-Administered Medications:  amLODIPine 10 mg Oral Daily Jeremias Antoine MD   apixaban 2 5 mg Oral BID Jeremias Antoine MD   donepezil 10 mg Oral HS Jeremias Antoine MD   DULoxetine 30 mg Oral Daily Jeremias Antoine MD   LORazepam 0 5 mg Oral Q8H PRN Jeremias Antoine MD   melatonin 3 mg Oral HS Jeremias Antoine MD   metoprolol tartrate 50 mg Oral Q12H [facility] Jeremias Antoine MD   pantoprazole 40 mg Oral Daily Before Breakfast Jeremias Antoine MD   predniSONE 5 mg Oral Daily Jeremias Antoine MD            Allergies   Allergen Reactions    Other       Hot pepper    Penicillins     Strawberry C [Ascorbate]     Tylenol [Acetaminophen]        ROS:  See HPI    Vitals:  See vitals    Physical Exam:  Gen:  Anxious and cooperative  HEENT:  Atraumatic normocephalic pupils equal round reactive to light extraocular muscles intact sclerae anicteric oral mucosa pink and moist  Neck:  Supple no JVD no lymphadenopathy, trachea midline  Chest:  Fine bibasilar crackles right greater than left, otherwise clear to auscultation bilaterally  Respirations slowed and were even and nonlabored  Cor:  Regular rate and rhythm no murmurs rubs or gallops appreciated  Abd:  Soft nontender with positive bowel sounds  Ext:  No clubbing cyanosis or edema  Neuro:  Confused, alert and oriented to self and place  Skin:  Warm dry and intact    No intake or output data in the 24 hours ending 08/24/19 1516    Respiratory    Lab Data (Last 4 hours)    None         O2/Vent Data (Last 4 hours)    None              Invasive Devices     None                 DIAGNOSTIC DATA:    Lab: I have personally reviewed pertinent lab results     CBC:   Results from last 7 days Lab Units 08/24/19  0544   WBC Thousand/uL 8 98   HEMOGLOBIN g/dL 12 2   HEMATOCRIT % 38 8   PLATELETS Thousands/uL 235     CMP:   Results from last 7 days   Lab Units 08/24/19  0544 08/23/19  0521 08/22/19  1511 08/22/19  0623   POTASSIUM mmol/L 4 1 2 9* 2 9* 2 5*   CHLORIDE mmol/L 106 106  --  100   CO2 mmol/L 27 29  --  30   BUN mg/dL 7 7  --  10   CREATININE mg/dL 0 70 0 64  --  0 84   CALCIUM mg/dL 9 2 9 2  --  9 4   ALK PHOS U/L  --   --   --  61   ALT U/L  --   --   --  17   AST U/L  --   --   --  15     PT/INR:   No results found for: PT, INR,   Magnesium: No components found for: MAG,   Phosphorous: No results found for: PHOS    Microbiology:  No results found for: Darene Liter, SPUTUMCULTUR      OUTCOME:   Stayed in room   Family notified of transfer:  N/A  Family member contacted: N/A  Code Status: Level 1 - Full Code  Critical Care Time: Total Critical Care time spent 15 minutes excluding procedures, teaching and family updates

## 2019-08-30 NOTE — PROGRESS NOTES
Cardiology Office Note  Marry Zuniga  [address] [place], [address] Cardiology [facility]  [address] [place], [place] [id number] [phone]  [phone]  08/30/19    Referring Physian: Tosha Cody MD  [address]  [facility] [address]       Chief Complain/Reason for Referal:  Follow-up for atrial fibrillation recent hospitalization    IMPRESSION:  1  Paroxysmal atrial fibrillation              -Kalina Vasc 4 (age, gender, HTN)  2  History of temporal arteritis, on prednisone  3  Dementia  4  Essential hypertension  5  GERD  6  Depression    DISCUSSION/RECOMMENDATIONS:  Patient was discharged from hospital on Eliquis 2 5 mg low dose due to her advanced dementia and possible fall risk  She is also on metoprolol 50 mg twice a day  She has maintained sinus rhythm and is in sinus rhythm today  She notes no complaints however she does have advanced dementia and the history she gives is unreliable  Will continue her current medications at this time  She may follow up as needed for atrial fibrillation   ----------------------------------------------------------------------------------------------  PRIOR STRESS TEST:  None   PRIOR CATH:  None  ECHO:  Echocardiogram performed August 24, 2019 shows a hyperdynamic left ventricle, EF 75%, no regional wall motion abnormalities, mild asymmetrical hypertrophy (1 1 cm of septum) grade 1 diastolic dysfunction  Posterior mitral annular calcification with restricted motion of the posterior leaflet  No evidence of stenosis, mild mitral regurgitation  Mild tricuspid regurgitation        Diagnoses and all orders for this visit:    Paroxysmal atrial fibrillation ([address] )  -     Ambulatory referral to Cardiology  -     POCT ECG       ======================================================    HPI:  I am seeing this patient in cardiology consultation for:  Follow-up atrial fibrillation hospitalization    Sandy Woody is a 80 y o  female with a past medical history of advanced dementia, hypertension, gastroesophageal reflux disease, temporal arteritis on prednisone and depression  She had a recent hospitalization for atrial fibrillation and rapid ventricular response  The symptoms she described a prompted hospitalization where epigastric discomfort  She spontaneously converted to normal sinus rhythm in the hospital and has since maintained normal sinus rhythm  She is on low-dose Eliquis and metoprolol 50 mg twice a day  She denies any complaints today however her history that she gives is unreliable secondary to severely advanced dementia       She had a echocardiogram with results as above        Past Medical History:   Diagnosis Date    Ambulatory dysfunction     Anxiety     Toscano's esophagus     Dementia     Diverticulitis     GERD (gastroesophageal reflux disease)     Hypertension     Pneumonia          Medications  Current Outpatient Medications   Medication Sig Dispense Refill    albuterol (2 5 mg/3 mL) 0 083 % nebulizer solution Take 2 5 mg by nebulization daily      amLODIPine (NORVASC) 10 mg tablet Take 10 mg by mouth daily      apixaban (ELIQUIS) 2 5 mg Take 1 tablet (2 5 mg total) by mouth 2 (two) times a day  0    bisacodyl (DULCOLAX) 10 mg suppository Insert 10 mg into the rectum      calcium carbonate (OS-RUPESH) 600 MG tablet Take 600 mg by mouth every 6 (six) hours as needed for heartburn      donepezil (ARICEPT) 10 mg tablet Take 10 mg by mouth      DULoxetine (CYMBALTA) 60 mg delayed release capsule Take 30 mg by mouth daily       LORazepam (ATIVAN) 0 5 mg tablet Take 0 5 mg by mouth every 8 (eight) hours as needed for anxiety      LORazepam (ATIVAN) 0 5 mg tablet lorazepam 0 5 mg tablet      magnesium hydroxide (MILK OF MAGNESIA) 800 MG/5ML suspension Take 30 mL by mouth daily as needed for constipation      melatonin 3 mg Take 3 mg by mouth      metoprolol tartrate (LOPRESSOR) 50 mg tablet Take 50 mg by mouth 2 (two) times a day      mineral oil enema Insert 1 enema into the rectum daily as needed for constipation      omeprazole (PriLOSEC) 20 mg delayed release capsule Take 20 mg by mouth daily in the early morning      predniSONE 5 mg tablet prednisone 5 mg tablet       No current facility-administered medications for this visit  Allergies   Allergen Reactions    Other       Hot pepper    Penicillins     Strawberry C [Ascorbate]     Tylenol [Acetaminophen]        History reviewed  No pertinent family history      Social History     Socioeconomic History    Marital status: /Civil Union     Spouse name: Not on file    Number of children: Not on file    Years of education: Not on file    Highest education level: Not on file   Occupational History    Not on file   Social Needs    Financial resource strain: Not on file    Food insecurity:     Worry: Not on file     Inability: Not on file    Transportation needs:     Medical: Not on file     Non-medical: Not on file   Tobacco Use    Smoking status: Former Smoker    Smokeless tobacco: Never Used   Substance and Sexual Activity    Alcohol use: No     Alcohol/week: 1 0 standard drinks     Types: 1 Glasses of wine per week    Drug use: No    Sexual activity: Not on file   Lifestyle    Physical activity:     Days per week: Not on file     Minutes per session: Not on file    Stress: Not on file   Relationships    Social connections:     Talks on phone: Not on file     Gets together: Not on file     Attends Spiritism service: Not on file     Active member of club or organization: Not on file     Attends meetings of clubs or organizations: Not on file     Relationship status: Not on file    Intimate partner violence:     Fear of current or ex partner: Not on file     Emotionally abused: Not on file     Physically abused: Not on file     Forced sexual activity: Not on file   Other Topics Concern    Not on file   Social History Narrative    Not on file Review of Systems   Constitutional: Negative for chills and fever  HENT: Negative for facial swelling and sore throat  Eyes: Negative for visual disturbance  Respiratory: Negative for cough, chest tightness, shortness of breath and wheezing  Cardiovascular: Negative for chest pain, palpitations and leg swelling  Gastrointestinal: Negative for abdominal pain, blood in stool, constipation, diarrhea, nausea and vomiting  Endocrine: Negative for cold intolerance and heat intolerance  Genitourinary: Negative for decreased urine volume, difficulty urinating, dysuria and hematuria  Musculoskeletal: Negative for arthralgias, back pain and myalgias  Skin: Negative for rash  Neurological: Negative for dizziness, syncope, weakness and numbness  Psychiatric/Behavioral: Negative for agitation, behavioral problems and confusion  The patient is not nervous/anxious  Vitals:    08/30/19 1300   BP: 124/60   Pulse: 62   SpO2: 97%       Physical Exam   General appearance: alert and oriented, in no acute distress  Head: Normocephalic, without obvious abnormality, atraumatic  Eyes:  Anicteric  Neck: no carotid bruit, no JVD  Lungs: clear to auscultation bilaterally  Heart: regular rate and rhythm, S1, S2 normal, no murmur, click, rub or gallop  Abdomen: soft, non-tender; bowel sounds normal; no masses,  no organomegaly  Extremities: extremities normal, warm and well-perfused; no cyanosis, clubbing, or edema  Skin: Skin color, texture, turgor normal  No rashes or lesions        EKG:  Results for orders placed or performed in visit on 08/30/19   POCT ECG    Impression    Sinus rhythm at 62 beats per minute  Normal ECG          Lab Results   Component Value Date    WBC 8 98 08/24/2019    HGB 12 2 08/24/2019    HCT 38 8 08/24/2019    MCV 94 08/24/2019     08/24/2019      Lab Results   Component Value Date    SODIUM 141 08/24/2019    K 4 1 08/24/2019     08/24/2019    CO2 27 08/24/2019 BUN 7 08/24/2019    CREATININE 0 70 08/24/2019    GLUC 127 08/24/2019    CALCIUM 9 2 08/24/2019      No results found for: HGBA1C   Lab Results   Component Value Date    CHOL 140 06/04/2014    CHOL 151 12/18/2013     Lab Results   Component Value Date    HDL 37 06/04/2014    HDL 37 12/18/2013     Lab Results   Component Value Date    LDLCALC 82 06/04/2014    [facility] 74 12/18/2013     Lab Results   Component Value Date    TRIG 104 06/04/2014    TRIG 198 12/18/2013     No results found for: CHOLHDL   Lab Results   Component Value Date    INR 1 01 09/09/2015    INR 1 01 09/03/2015    INR 0 99 06/05/2015    PROTIME 13 3 09/09/2015    PROTIME 13 3 09/03/2015    PROTIME 13 1 06/05/2015          Patient Active Problem List    Diagnosis Date Noted    Paroxysmal atrial fibrillation ([facility] ) 08/22/2019     Priority: Low    Choking episode occurring during daytime 02/05/2019     Priority: Low    Dementia 02/05/2019     Priority: Low    Toscano's esophagus 02/05/2019     Priority: Low    GERD (gastroesophageal reflux disease) 02/05/2019     Priority: Low    Respiratory distress      Priority: Low    Cyanosis      Priority: Low    Essential hypertension 04/20/2017     Priority: Low    Temporal arteritis ([facility] ) 04/20/2017     Priority: Low    Depression 02/17/2014     Priority: Low       Portions of the record may have been created with voice recognition software  Occasional wrong word or "sound a like" substitutions may have occurred due to the inherent limitations of voice recognition software  Read the chart carefully and recognize, using context, where substitutions have occurred        Robbin Phelan DO  8/30/2019 1:45 PM

## 2020-01-01 ENCOUNTER — APPOINTMENT (EMERGENCY)
Dept: RADIOLOGY | Facility: HOSPITAL | Age: 85
End: 2020-01-01
Payer: MEDICARE

## 2020-01-01 ENCOUNTER — HOSPITAL ENCOUNTER (EMERGENCY)
Facility: HOSPITAL | Age: 85
Discharge: HOME | End: 2020-02-08
Attending: EMERGENCY MEDICINE | Admitting: EMERGENCY MEDICINE
Payer: MEDICARE

## 2020-01-01 ENCOUNTER — HOSPITAL ENCOUNTER (INPATIENT)
Facility: HOSPITAL | Age: 85
LOS: 1 days | DRG: 871 | End: 2020-06-28
Attending: EMERGENCY MEDICINE | Admitting: INTERNAL MEDICINE
Payer: MEDICARE

## 2020-01-01 ENCOUNTER — APPOINTMENT (EMERGENCY)
Dept: CT IMAGING | Facility: HOSPITAL | Age: 85
DRG: 871 | End: 2020-01-01
Payer: MEDICARE

## 2020-01-01 VITALS
HEIGHT: 63 IN | HEART RATE: 121 BPM | OXYGEN SATURATION: 94 % | DIASTOLIC BLOOD PRESSURE: 73 MMHG | RESPIRATION RATE: 28 BRPM | BODY MASS INDEX: 30.35 KG/M2 | WEIGHT: 171.3 LBS | SYSTOLIC BLOOD PRESSURE: 131 MMHG | TEMPERATURE: 98.4 F

## 2020-01-01 VITALS
TEMPERATURE: 98.4 F | SYSTOLIC BLOOD PRESSURE: 158 MMHG | RESPIRATION RATE: 16 BRPM | HEART RATE: 73 BPM | DIASTOLIC BLOOD PRESSURE: 70 MMHG | OXYGEN SATURATION: 94 %

## 2020-01-01 DIAGNOSIS — I50.9 CHF (CONGESTIVE HEART FAILURE) (HCC): ICD-10-CM

## 2020-01-01 DIAGNOSIS — U07.1 COVID-19: Primary | ICD-10-CM

## 2020-01-01 DIAGNOSIS — R25.1 SHAKING: Primary | ICD-10-CM

## 2020-01-01 DIAGNOSIS — R93.89 ABNORMAL X-RAY: ICD-10-CM

## 2020-01-01 DIAGNOSIS — K85.10 ACUTE GALLSTONE PANCREATITIS: ICD-10-CM

## 2020-01-01 LAB
ALBUMIN SERPL BCP-MCNC: 3.3 G/DL (ref 3.5–5)
ALP SERPL-CCNC: 108 U/L (ref 46–116)
ALT SERPL W P-5'-P-CCNC: 131 U/L (ref 12–78)
ANION GAP SERPL CALCULATED.3IONS-SCNC: 13 MMOL/L (ref 4–13)
ANION GAP SERPL CALCULATED.3IONS-SCNC: 7 MMOL/L (ref 4–13)
APTT PPP: 34 SECONDS (ref 23–37)
AST SERPL W P-5'-P-CCNC: 104 U/L (ref 5–45)
ATRIAL RATE: 102 BPM
ATRIAL RATE: 65 BPM
BACTERIA UR QL AUTO: ABNORMAL /HPF
BASOPHILS # BLD MANUAL: 0 THOUSAND/UL (ref 0–0.1)
BASOPHILS NFR MAR MANUAL: 0 % (ref 0–1)
BILIRUB SERPL-MCNC: 1.3 MG/DL (ref 0.2–1)
BILIRUB UR QL STRIP: NEGATIVE
BUN SERPL-MCNC: 10 MG/DL (ref 5–25)
BUN SERPL-MCNC: 24 MG/DL (ref 5–25)
CALCIUM SERPL-MCNC: 9.1 MG/DL (ref 8.3–10.1)
CALCIUM SERPL-MCNC: 9.3 MG/DL (ref 8.3–10.1)
CHLORIDE SERPL-SCNC: 100 MMOL/L (ref 100–108)
CHLORIDE SERPL-SCNC: 103 MMOL/L (ref 100–108)
CLARITY UR: CLEAR
CO2 SERPL-SCNC: 24 MMOL/L (ref 21–32)
CO2 SERPL-SCNC: 32 MMOL/L (ref 21–32)
COLOR UR: ABNORMAL
CREAT SERPL-MCNC: 0.77 MG/DL (ref 0.6–1.3)
CREAT SERPL-MCNC: 1.38 MG/DL (ref 0.6–1.3)
EOSINOPHIL # BLD MANUAL: 0 THOUSAND/UL (ref 0–0.4)
EOSINOPHIL NFR BLD MANUAL: 0 % (ref 0–6)
ERYTHROCYTE [DISTWIDTH] IN BLOOD BY AUTOMATED COUNT: 15 % (ref 11.6–15.1)
GFR SERPL CREATININE-BSD FRML MDRD: 34 ML/MIN/1.73SQ M
GFR SERPL CREATININE-BSD FRML MDRD: 69 ML/MIN/1.73SQ M
GLUCOSE SERPL-MCNC: 103 MG/DL (ref 65–140)
GLUCOSE SERPL-MCNC: 157 MG/DL (ref 65–140)
GLUCOSE UR STRIP-MCNC: NEGATIVE MG/DL
HCT VFR BLD AUTO: 50.5 % (ref 34.8–46.1)
HGB BLD-MCNC: 16.5 G/DL (ref 11.5–15.4)
HGB UR QL STRIP.AUTO: NEGATIVE
HYALINE CASTS #/AREA URNS LPF: ABNORMAL /LPF
INR PPP: 1.41 (ref 0.84–1.19)
KETONES UR STRIP-MCNC: NEGATIVE MG/DL
LACTATE SERPL-SCNC: 4.5 MMOL/L (ref 0.5–2)
LACTATE SERPL-SCNC: 5.6 MMOL/L (ref 0.5–2)
LEUKOCYTE ESTERASE UR QL STRIP: ABNORMAL
LG PLATELETS BLD QL SMEAR: PRESENT
LIPASE SERPL-CCNC: 7852 U/L (ref 73–393)
LYMPHOCYTES # BLD AUTO: 0.39 THOUSAND/UL (ref 0.6–4.47)
LYMPHOCYTES # BLD AUTO: 2 % (ref 14–44)
MCH RBC QN AUTO: 28.6 PG (ref 26.8–34.3)
MCHC RBC AUTO-ENTMCNC: 32.7 G/DL (ref 31.4–37.4)
MCV RBC AUTO: 88 FL (ref 82–98)
MONOCYTES # BLD AUTO: 0.98 THOUSAND/UL (ref 0–1.22)
MONOCYTES NFR BLD: 5 % (ref 4–12)
NEUTROPHILS # BLD MANUAL: 18.19 THOUSAND/UL (ref 1.85–7.62)
NEUTS BAND NFR BLD MANUAL: 30 % (ref 0–8)
NEUTS SEG NFR BLD AUTO: 63 % (ref 43–75)
NITRITE UR QL STRIP: NEGATIVE
NON-SQ EPI CELLS URNS QL MICRO: ABNORMAL /HPF
NRBC BLD AUTO-RTO: 0 /100 WBCS
NT-PROBNP SERPL-MCNC: 7517 PG/ML
OVALOCYTES BLD QL SMEAR: PRESENT
P AXIS: 49 DEGREES
P AXIS: 70 DEGREES
PH UR STRIP.AUTO: 6 [PH]
PLATELET # BLD AUTO: 276 THOUSANDS/UL (ref 149–390)
PLATELET BLD QL SMEAR: ADEQUATE
PMV BLD AUTO: 10 FL (ref 8.9–12.7)
POTASSIUM SERPL-SCNC: 3.6 MMOL/L (ref 3.5–5.3)
POTASSIUM SERPL-SCNC: 3.8 MMOL/L (ref 3.5–5.3)
PR INTERVAL: 128 MS
PR INTERVAL: 142 MS
PROT SERPL-MCNC: 7.2 G/DL (ref 6.4–8.2)
PROT UR STRIP-MCNC: ABNORMAL MG/DL
PROTHROMBIN TIME: 17.5 SECONDS (ref 11.6–14.5)
QRS AXIS: 16 DEGREES
QRS AXIS: 17 DEGREES
QRSD INTERVAL: 74 MS
QRSD INTERVAL: 84 MS
QT INTERVAL: 358 MS
QT INTERVAL: 474 MS
QTC INTERVAL: 466 MS
QTC INTERVAL: 492 MS
RBC # BLD AUTO: 5.76 MILLION/UL (ref 3.81–5.12)
RBC #/AREA URNS AUTO: ABNORMAL /HPF
SODIUM SERPL-SCNC: 137 MMOL/L (ref 136–145)
SODIUM SERPL-SCNC: 142 MMOL/L (ref 136–145)
SP GR UR STRIP.AUTO: 1.02 (ref 1–1.03)
T WAVE AXIS: 18 DEGREES
T WAVE AXIS: 58 DEGREES
TOTAL CELLS COUNTED SPEC: 100
TROPONIN I SERPL-MCNC: <0.02 NG/ML
UROBILINOGEN UR QL STRIP.AUTO: 1 E.U./DL
VENTRICULAR RATE: 102 BPM
VENTRICULAR RATE: 65 BPM
WBC # BLD AUTO: 19.56 THOUSAND/UL (ref 4.31–10.16)
WBC #/AREA URNS AUTO: ABNORMAL /HPF

## 2020-01-01 PROCEDURE — 93010 ELECTROCARDIOGRAM REPORT: CPT | Performed by: INTERNAL MEDICINE

## 2020-01-01 PROCEDURE — 81001 URINALYSIS AUTO W/SCOPE: CPT | Performed by: EMERGENCY MEDICINE

## 2020-01-01 PROCEDURE — 74177 CT ABD & PELVIS W/CONTRAST: CPT

## 2020-01-01 PROCEDURE — 87040 BLOOD CULTURE FOR BACTERIA: CPT | Performed by: EMERGENCY MEDICINE

## 2020-01-01 PROCEDURE — 96374 THER/PROPH/DIAG INJ IV PUSH: CPT

## 2020-01-01 PROCEDURE — 83605 ASSAY OF LACTIC ACID: CPT | Performed by: EMERGENCY MEDICINE

## 2020-01-01 PROCEDURE — 99223 1ST HOSP IP/OBS HIGH 75: CPT | Performed by: SURGERY

## 2020-01-01 PROCEDURE — 84484 ASSAY OF TROPONIN QUANT: CPT | Performed by: EMERGENCY MEDICINE

## 2020-01-01 PROCEDURE — 83690 ASSAY OF LIPASE: CPT | Performed by: EMERGENCY MEDICINE

## 2020-01-01 PROCEDURE — 36415 COLL VENOUS BLD VENIPUNCTURE: CPT | Performed by: EMERGENCY MEDICINE

## 2020-01-01 PROCEDURE — 71046 X-RAY EXAM CHEST 2 VIEWS: CPT

## 2020-01-01 PROCEDURE — 96361 HYDRATE IV INFUSION ADD-ON: CPT

## 2020-01-01 PROCEDURE — 99285 EMERGENCY DEPT VISIT HI MDM: CPT

## 2020-01-01 PROCEDURE — 93005 ELECTROCARDIOGRAM TRACING: CPT

## 2020-01-01 PROCEDURE — 99233 SBSQ HOSP IP/OBS HIGH 50: CPT | Performed by: NURSE PRACTITIONER

## 2020-01-01 PROCEDURE — 85027 COMPLETE CBC AUTOMATED: CPT | Performed by: EMERGENCY MEDICINE

## 2020-01-01 PROCEDURE — 83880 ASSAY OF NATRIURETIC PEPTIDE: CPT | Performed by: EMERGENCY MEDICINE

## 2020-01-01 PROCEDURE — 99223 1ST HOSP IP/OBS HIGH 75: CPT | Performed by: PHYSICIAN ASSISTANT

## 2020-01-01 PROCEDURE — 99285 EMERGENCY DEPT VISIT HI MDM: CPT | Performed by: EMERGENCY MEDICINE

## 2020-01-01 PROCEDURE — 85610 PROTHROMBIN TIME: CPT | Performed by: EMERGENCY MEDICINE

## 2020-01-01 PROCEDURE — 85730 THROMBOPLASTIN TIME PARTIAL: CPT | Performed by: EMERGENCY MEDICINE

## 2020-01-01 PROCEDURE — 80053 COMPREHEN METABOLIC PANEL: CPT | Performed by: EMERGENCY MEDICINE

## 2020-01-01 PROCEDURE — 99284 EMERGENCY DEPT VISIT MOD MDM: CPT | Performed by: EMERGENCY MEDICINE

## 2020-01-01 PROCEDURE — 85007 BL SMEAR W/DIFF WBC COUNT: CPT | Performed by: EMERGENCY MEDICINE

## 2020-01-01 PROCEDURE — 96375 TX/PRO/DX INJ NEW DRUG ADDON: CPT

## 2020-01-01 PROCEDURE — 70450 CT HEAD/BRAIN W/O DYE: CPT

## 2020-01-01 PROCEDURE — 71260 CT THORAX DX C+: CPT

## 2020-01-01 PROCEDURE — 80048 BASIC METABOLIC PNL TOTAL CA: CPT | Performed by: EMERGENCY MEDICINE

## 2020-01-01 RX ORDER — MINERAL OIL AND PETROLATUM 150; 830 MG/G; MG/G
OINTMENT OPHTHALMIC 4 TIMES DAILY PRN
Status: DISCONTINUED | OUTPATIENT
Start: 2020-01-01 | End: 2020-01-01 | Stop reason: HOSPADM

## 2020-01-01 RX ORDER — DONEPEZIL HYDROCHLORIDE 10 MG/1
10 TABLET, FILM COATED ORAL ONCE
Status: COMPLETED | OUTPATIENT
Start: 2020-01-01 | End: 2020-01-01

## 2020-01-01 RX ORDER — ONDANSETRON 2 MG/ML
4 INJECTION INTRAMUSCULAR; INTRAVENOUS ONCE
Status: COMPLETED | OUTPATIENT
Start: 2020-01-01 | End: 2020-01-01

## 2020-01-01 RX ORDER — LANOLIN ALCOHOL/MO/W.PET/CERES
3 CREAM (GRAM) TOPICAL
Status: DISCONTINUED | OUTPATIENT
Start: 2020-01-01 | End: 2020-01-01 | Stop reason: HOSPADM

## 2020-01-01 RX ORDER — LORAZEPAM 0.5 MG/1
0.5 TABLET ORAL ONCE
Status: COMPLETED | OUTPATIENT
Start: 2020-01-01 | End: 2020-01-01

## 2020-01-01 RX ORDER — MORPHINE SULFATE 100 MG/5ML
5 SOLUTION ORAL EVERY 4 HOURS
Status: DISCONTINUED | OUTPATIENT
Start: 2020-01-01 | End: 2020-01-01 | Stop reason: HOSPADM

## 2020-01-01 RX ORDER — LORAZEPAM 2 MG/ML
0.5 INJECTION INTRAMUSCULAR EVERY 2 HOUR PRN
Status: DISCONTINUED | OUTPATIENT
Start: 2020-01-01 | End: 2020-01-01 | Stop reason: HOSPADM

## 2020-01-01 RX ADMIN — LORAZEPAM 0.5 MG: 0.5 TABLET ORAL at 22:06

## 2020-01-01 RX ADMIN — MORPHINE SULFATE 1 MG: 2 INJECTION, SOLUTION INTRAMUSCULAR; INTRAVENOUS at 21:35

## 2020-01-01 RX ADMIN — MORPHINE SULFATE 5 MG: 100 SOLUTION ORAL at 16:28

## 2020-01-01 RX ADMIN — ONDANSETRON 4 MG: 2 INJECTION INTRAMUSCULAR; INTRAVENOUS at 10:15

## 2020-01-01 RX ADMIN — IOHEXOL 100 ML: 350 INJECTION, SOLUTION INTRAVENOUS at 11:11

## 2020-01-01 RX ADMIN — MORPHINE SULFATE 2 MG: 2 INJECTION, SOLUTION INTRAMUSCULAR; INTRAVENOUS at 13:22

## 2020-01-01 RX ADMIN — DONEPEZIL HYDROCHLORIDE 10 MG: 10 TABLET, FILM COATED ORAL at 22:07

## 2020-01-01 RX ADMIN — MELATONIN 3 MG: 3 TAB ORAL at 22:07

## 2020-01-01 RX ADMIN — SODIUM CHLORIDE 500 ML: 0.9 INJECTION, SOLUTION INTRAVENOUS at 10:15

## 2020-02-08 NOTE — ED PROVIDER NOTES
History  Chief Complaint   Patient presents with    Shaking     Pt brought in via EMS from [facility] after having an episode of "uncontrollable shaking" while she was stating "I can't breathe", was given 1mg PO ativan by the nursing home, upon being picked up by EMS pt was calm and had fallen asleep  81 yo female transferred from [facility] living for further evaluation, after she had a spell of "shaking", witnessed by a daughter visiting, although it is her son who is here now and says it was described as remaining, alert, randome twitching like movements of arms and legs, that were causing her some distress and she felt that she was having shortness of breath   She has had similar issues before, and tonight they gave her additional lorazepam 1mg po, after which the movements dissipated quickly and she became calm and was able to fall asleep  There was no bowel or bladder incontinence   She recalls the spell and says she felt anxious and couldn't calm down  History provided by:  Patient, nursing home and relative  History limited by:  Dementia      Prior to Admission Medications   Prescriptions Last Dose Informant Patient Reported? Taking?    DULoxetine (CYMBALTA) 60 mg delayed release capsule  Self Yes No   Sig: Take 30 mg by mouth daily    LORazepam (ATIVAN) 0 5 mg tablet  Self Yes No   Sig: Take 0 5 mg by mouth every 8 (eight) hours as needed for anxiety   LORazepam (ATIVAN) 0 5 mg tablet  Self Yes No   Sig: lorazepam 0 5 mg tablet   albuterol (2 5 mg/3 mL) 0 083 % nebulizer solution  Self Yes No   Sig: Take 2 5 mg by nebulization daily   amLODIPine (NORVASC) 10 mg tablet  Self Yes No   Sig: Take 10 mg by mouth daily   apixaban (ELIQUIS) 2 5 mg  Self No No   Sig: Take 1 tablet (2 5 mg total) by mouth 2 (two) times a day   bisacodyl (DULCOLAX) 10 mg suppository  Self Yes No   Sig: Insert 10 mg into the rectum   calcium carbonate (OS-RUPESH) 600 MG tablet  Self Yes No   Sig: Take 600 mg by mouth every 6 (six) hours as needed for heartburn   donepezil (ARICEPT) 10 mg tablet  Self Yes No   Sig: Take 10 mg by mouth   magnesium hydroxide (MILK OF MAGNESIA) 800 MG/5ML suspension  Self Yes No   Sig: Take 30 mL by mouth daily as needed for constipation   melatonin 3 mg  Self Yes No   Sig: Take 3 mg by mouth   metoprolol tartrate (LOPRESSOR) 50 mg tablet  Self Yes No   Sig: Take 50 mg by mouth 2 (two) times a day   mineral oil enema  Self Yes No   Sig: Insert 1 enema into the rectum daily as needed for constipation   omeprazole (PriLOSEC) 20 mg delayed release capsule  Self Yes No   Sig: Take 20 mg by mouth daily in the early morning   predniSONE 5 mg tablet  Self Yes No   Sig: prednisone 5 mg tablet      Facility-Administered Medications: None       Past Medical History:   Diagnosis Date    Ambulatory dysfunction     Anxiety     Toscano's esophagus     Dementia (HCC)     Diverticulitis     GERD (gastroesophageal reflux disease)     Hypertension     Pneumonia        Past Surgical History:   Procedure Laterality Date    HYSTERECTOMY      TOTAL HIP ARTHROPLASTY         History reviewed  No pertinent family history  I have reviewed and agree with the history as documented  Social History     Tobacco Use    Smoking status: Former Smoker    Smokeless tobacco: Never Used   Substance Use Topics    Alcohol use: No     Alcohol/week: 1 0 standard drinks     Types: 1 Glasses of wine per week    Drug use: No        Review of Systems   Constitutional: Negative for appetite change, chills and fever  HENT: Negative for sore throat  Respiratory: Negative for cough, shortness of breath and wheezing  Cardiovascular: Negative for chest pain and palpitations  Gastrointestinal: Negative for abdominal pain, diarrhea, nausea and vomiting  Genitourinary: Negative for dysuria and hematuria  Musculoskeletal: Negative for neck pain  Skin: Negative for rash  Neurological: Positive for tremors  Negative for dizziness, weakness and headaches  Psychiatric/Behavioral: Negative for suicidal ideas  All other systems reviewed and are negative  Physical Exam  Physical Exam   Constitutional: She is oriented to person, place, and time  Vital signs are normal  She appears well-developed and well-nourished  Non-toxic appearance  HENT:   Head: Normocephalic and atraumatic  Right Ear: Tympanic membrane and external ear normal    Left Ear: Tympanic membrane and external ear normal    Nose: Nose normal    Mouth/Throat: Oropharynx is clear and moist    Eyes: Pupils are equal, round, and reactive to light  Conjunctivae and EOM are normal    Neck: Normal range of motion and full passive range of motion without pain  Neck supple  No Brudzinski's sign and no Kernig's sign noted  Cardiovascular: Normal rate, regular rhythm, normal heart sounds, intact distal pulses and normal pulses  No murmur heard  Pulmonary/Chest: Effort normal and breath sounds normal  No tachypnea  No respiratory distress  She has no wheezes  Abdominal: Soft  Bowel sounds are normal  She exhibits no distension  There is no tenderness  There is no rigidity, no rebound and no guarding  Musculoskeletal: Normal range of motion  Right lower leg: She exhibits no swelling  Left lower leg: She exhibits no swelling  Lymphadenopathy:     She has no cervical adenopathy  Neurological: She is alert and oriented to person, place, and time  She has normal strength and normal reflexes  No cranial nerve deficit or sensory deficit  Coordination and gait normal  GCS eye subscore is 4  GCS verbal subscore is 5  GCS motor subscore is 6  She confabulates some but generally aware of recent events and interacts with family contemporaneously  Skin: Skin is warm and dry  No rash noted  She is not diaphoretic  No pallor  Psychiatric: She has a normal mood and affect   Her speech is normal and behavior is normal  Judgment and thought content normal  Cognition and memory are normal    Nursing note and vitals reviewed        Vital Signs  ED Triage Vitals   Temperature Pulse Respirations Blood Pressure SpO2   02/08/20 1744 02/08/20 1744 02/08/20 1744 02/08/20 1744 02/08/20 1744   98 4 °F (36 9 °C) 62 18 146/67 95 %      Temp Source Heart Rate Source Patient Position - Orthostatic VS BP Location FiO2 (%)   02/08/20 1744 02/08/20 1744 02/08/20 2050 02/08/20 2050 --   Temporal Monitor Lying Left arm       Pain Score       02/08/20 1744       No Pain           Vitals:    02/08/20 1744 02/08/20 2050 02/08/20 2341   BP: 146/67 135/63 158/70   Pulse: 62 73 73   Patient Position - Orthostatic VS:  Lying Lying         Visual Acuity      ED Medications  Medications   donepezil (ARICEPT) tablet 10 mg (10 mg Oral Given 2/8/20 2207)   LORazepam (ATIVAN) tablet 0 5 mg (0 5 mg Oral Given 2/8/20 2206)       Diagnostic Studies  Results Reviewed     Procedure Component Value Units Date/Time    Basic metabolic panel [[id number]] Collected:  02/08/20 1923    Lab Status:  Final result Specimen:  Blood from Arm, Right Updated:  02/08/20 1944     Sodium 142 mmol/L      Potassium 3 6 mmol/L      Chloride 103 mmol/L      CO2 32 mmol/L      ANION GAP 7 mmol/L      BUN 10 mg/dL      Creatinine 0 77 mg/dL      Glucose 103 mg/dL      Calcium 9 1 mg/dL      eGFR 69 ml/min/1 73sq m     Narrative:       [facility] guidelines for Chronic Kidney Disease (CKD):     Stage 1 with normal or high GFR (GFR > 90 mL/min/1 73 square meters)    Stage 2 Mild CKD (GFR = 60-89 mL/min/1 73 square meters)    Stage 3A Moderate CKD (GFR = 45-59 mL/min/1 73 square meters)    Stage 3B Moderate CKD (GFR = 30-44 mL/min/1 73 square meters)    Stage 4 Severe CKD (GFR = 15-29 mL/min/1 73 square meters)    Stage 5 End Stage CKD (GFR <15 mL/min/1 73 square meters)  Note: GFR calculation is accurate only with a steady state creatinine                 XR chest 2 views   Final Result by Seth Paz MD (02/08 2026)      No acute cardiopulmonary disease  Lucencies in the left humeral head appears stable from prior  These are indeterminate  Consider dedicated nonemergent shoulder imaging  The study was marked in EPIC for significant notification  Workstation performed: [id number]                    Procedures  ECG 12 Lead Documentation Only  Date/Time: 2/8/2020 7:43 PM  Performed by: Carlos Araujo MD  Authorized by: Carlos Araujo MD     Rate:     ECG rate:  65  Rhythm:     Rhythm: sinus rhythm    Ectopy:     Ectopy: none    QRS:     QRS axis:  Normal    QRS intervals:  Normal  Conduction:     Conduction: normal    ST segments:     ST segments:  Normal  T waves:     T waves: normal    Other findings:     Other findings: prolonged qTc interval               ED Course  ED Course as of Feb 09 1558   Sat Feb 08, 2020 2026 No acute findings      XR chest 2 views   2032 Shoulder lucencies read as stable   XR chest 2 views                               MDM  Number of Diagnoses or Management Options  Diagnosis management comments: The spell was already resolved by the time she was seen in the ED, and the description is not c/w seizure, although it did resolve quickly with benzo with no change in level of consciousness  Disposition  Final diagnoses:   Shaking   Abnormal x-ray - left shoulder     Time reflects when diagnosis was documented in both MDM as applicable and the Disposition within this note     Time User Action Codes Description Comment    2/8/2020  8:33 PM Kathya Likes Add [R25 1] Shaking     2/8/2020  8:39 PM Savita Mode L Add [R93 89] Abnormal x-ray     2/8/2020  8:40 PM Steven Grimm [R93 89] Abnormal x-ray left shoulder      ED Disposition     ED Disposition Condition Date/Time Comment    Discharge Good Sat Feb 8, 2020  0:79 PM Sandro Haynes discharge to home/self care              Follow-up Information     Follow up With Specialties Details Why Leobardo Mane MD Family Medicine Call  For followup [address] [facility] [address]/[place]   [phone]            Discharge Medication List as of 2/8/2020 10:41 PM      CONTINUE these medications which have NOT CHANGED    Details   albuterol (2 5 mg/3 mL) 0 083 % nebulizer solution Take 2 5 mg by nebulization daily, Historical Med      amLODIPine (NORVASC) 10 mg tablet Take 10 mg by mouth daily, Historical Med      apixaban (ELIQUIS) 2 5 mg Take 1 tablet (2 5 mg total) by mouth 2 (two) times a day, Starting Sat 8/24/2019, No Print      bisacodyl (DULCOLAX) 10 mg suppository Insert 10 mg into the rectum, Historical Med      calcium carbonate (OS-RUPESH) 600 MG tablet Take 600 mg by mouth every 6 (six) hours as needed for heartburn, Historical Med      donepezil (ARICEPT) 10 mg tablet Take 10 mg by mouth, Historical Med      DULoxetine (CYMBALTA) 60 mg delayed release capsule Take 30 mg by mouth daily , Historical Med      !! LORazepam (ATIVAN) 0 5 mg tablet Take 0 5 mg by mouth every 8 (eight) hours as needed for anxiety, Historical Med      !! LORazepam (ATIVAN) 0 5 mg tablet lorazepam 0 5 mg tablet, Historical Med      magnesium hydroxide (MILK OF MAGNESIA) 800 MG/5ML suspension Take 30 mL by mouth daily as needed for constipation, Historical Med      melatonin 3 mg Take 3 mg by mouth, Historical Med      metoprolol tartrate (LOPRESSOR) 50 mg tablet Take 50 mg by mouth 2 (two) times a day, Historical Med      mineral oil enema Insert 1 enema into the rectum daily as needed for constipation, Historical Med      omeprazole (PriLOSEC) 20 mg delayed release capsule Take 20 mg by mouth daily in the early morning, Historical Med      predniSONE 5 mg tablet prednisone 5 mg tablet, Historical Med       !! - Potential duplicate medications found  Please discuss with provider  No discharge procedures on file      ED Provider  Electronically Signed by Yokasta Morrison MD  02/09/20 2237

## 2020-02-09 NOTE — ED NOTES
Called [facility], spoke to [place], made aware pt will be discharged and will be picked up at 1130pm by [facility] also made aware of which bedtime meds pt will be receiving      Foreign Cook RN  02/08/20 [id number]

## 2020-02-09 NOTE — DISCHARGE INSTRUCTIONS
ED tests was reassuring  The lorazepam worke well, so it can be used according to the as needed order  Return if the spells are not resolving with medication or have new concerning features  The left shoulder has an area of thin bone that has been seen before the radiologist recommended getting dedicated images of to see what might be causing it

## 2020-06-27 PROBLEM — J96.01 ACUTE RESPIRATORY FAILURE WITH HYPOXIA (HCC): Status: ACTIVE | Noted: 2020-01-01

## 2020-06-27 PROBLEM — R65.20 SEVERE SEPSIS (HCC): Status: ACTIVE | Noted: 2020-01-01

## 2020-06-27 PROBLEM — K81.0 ACUTE CHOLECYSTITIS: Status: ACTIVE | Noted: 2020-01-01

## 2020-06-27 PROBLEM — U07.1 COVID-19 VIRUS DETECTED: Status: ACTIVE | Noted: 2020-01-01

## 2020-06-27 PROBLEM — K85.90 ACUTE PANCREATITIS: Status: ACTIVE | Noted: 2020-01-01

## 2020-06-27 PROBLEM — A41.9 SEVERE SEPSIS (HCC): Status: ACTIVE | Noted: 2020-01-01

## 2020-06-27 NOTE — ED PROVIDER NOTES
History  Chief Complaint   Patient presents with    Abdominal Pain     per EMS pt presents from [facility] with complaint of abd pain since this morning  According to EMS pt vomited approx 200mL this morning and had a fever  pt is COVID-19 positive since 6/19 and has had a decrease in mental status since then  Patient arrives to the ER via EMS from [facility] senior [facility] for complaint of abdominal pain , fever, and vomiting approximately 200 milliliters this a m  As per staff  She tested positive for COVID on 06/19  She does have some cough and her pulse ox is 93% on room air upon arrival to the ER  The staff reports that she has had a decrease in her mental status lately  Prior to Admission Medications   Prescriptions Last Dose Informant Patient Reported? Taking?    DULoxetine (CYMBALTA) 60 mg delayed release capsule  Self Yes No   Sig: Take 30 mg by mouth daily    LORazepam (ATIVAN) 0 5 mg tablet  Self Yes No   Sig: Take 0 5 mg by mouth every 8 (eight) hours as needed for anxiety   LORazepam (ATIVAN) 0 5 mg tablet  Self Yes No   Sig: lorazepam 0 5 mg tablet   albuterol (2 5 mg/3 mL) 0 083 % nebulizer solution  Self Yes No   Sig: Take 2 5 mg by nebulization daily   amLODIPine (NORVASC) 10 mg tablet  Self Yes No   Sig: Take 10 mg by mouth daily   apixaban (ELIQUIS) 2 5 mg  Self No No   Sig: Take 1 tablet (2 5 mg total) by mouth 2 (two) times a day   bisacodyl (DULCOLAX) 10 mg suppository  Self Yes No   Sig: Insert 10 mg into the rectum   calcium carbonate (OS-RUPESH) 600 MG tablet  Self Yes No   Sig: Take 600 mg by mouth every 6 (six) hours as needed for heartburn   donepezil (ARICEPT) 10 mg tablet  Self Yes No   Sig: Take 10 mg by mouth   magnesium hydroxide (MILK OF MAGNESIA) 800 MG/5ML suspension  Self Yes No   Sig: Take 30 mL by mouth daily as needed for constipation   melatonin 3 mg  Self Yes No   Sig: Take 3 mg by mouth   metoprolol tartrate (LOPRESSOR) 50 mg tablet  Self Yes No Sig: Take 50 mg by mouth 2 (two) times a day   mineral oil enema  Self Yes No   Sig: Insert 1 enema into the rectum daily as needed for constipation   omeprazole (PriLOSEC) 20 mg delayed release capsule  Self Yes No   Sig: Take 20 mg by mouth daily in the early morning   predniSONE 5 mg tablet  Self Yes No   Sig: prednisone 5 mg tablet      Facility-Administered Medications: None       Past Medical History:   Diagnosis Date    Ambulatory dysfunction     Anxiety     Toscano's esophagus     Dementia (HCC)     Diverticulitis     GERD (gastroesophageal reflux disease)     Hypertension     Pneumonia        Past Surgical History:   Procedure Laterality Date    HYSTERECTOMY      TOTAL HIP ARTHROPLASTY         History reviewed  No pertinent family history  I have reviewed and agree with the history as documented  E-Cigarette/Vaping    E-Cigarette Use Never User      E-Cigarette/Vaping Substances     Social History     Tobacco Use    Smoking status: Former Smoker    Smokeless tobacco: Never Used   Substance Use Topics    Alcohol use: No     Alcohol/week: 1 0 standard drinks     Types: 1 Glasses of wine per week    Drug use: No       Review of Systems   Constitutional: Positive for fever  HENT: Negative for rhinorrhea and sore throat  Respiratory: Positive for cough  Negative for shortness of breath and wheezing  Cardiovascular: Negative for chest pain and leg swelling  Gastrointestinal: Positive for abdominal pain  Negative for diarrhea and vomiting  Genitourinary: Negative for dysuria and flank pain  Musculoskeletal: Negative for back pain and neck pain  Skin: Negative for rash  Neurological: Negative for syncope and headaches  Psychiatric/Behavioral:        Mood normal       Physical Exam  Physical Exam   Constitutional: She appears well-developed and well-nourished  HENT:   Head: Normocephalic and atraumatic  Neck: Normal range of motion  Neck supple     Cardiovascular: Normal rate and regular rhythm  Pulmonary/Chest: Effort normal and breath sounds normal    Abdominal: Soft  Mid Abdominal tenderness, +guarding   Musculoskeletal: Normal range of motion  Neurological: She is alert  Skin: Skin is warm and dry  Psychiatric:   Oriented to person and year, not place   Nursing note and vitals reviewed  Vital Signs  ED Triage Vitals   Temperature Pulse Respirations Blood Pressure SpO2   06/27/20 0927 06/27/20 0930 06/27/20 0930 06/27/20 0930 06/27/20 0930   98 7 °F (37 1 °C) 94 20 (!) 173/72 93 %      Temp Source Heart Rate Source Patient Position - Orthostatic VS BP Location FiO2 (%)   06/27/20 0927 06/27/20 0930 06/27/20 0930 06/27/20 0930 --   Oral Monitor Lying Left arm       Pain Score       06/27/20 1132       5           Vitals:    06/27/20 1319 06/27/20 1330 06/27/20 1444 06/27/20 1547   BP: 146/64 146/64 134/59 131/73   Pulse: (!) 111 (!) 114 (!) 114 (!) 121   Patient Position - Orthostatic VS: Lying Lying Lying          Visual Acuity  Visual Acuity      Most Recent Value   L Pupil Size (mm)  2   R Pupil Size (mm)  2          ED Medications  Medications   sodium chloride 0 9 % bolus 500 mL (0 mL Intravenous Stopped 6/27/20 1105)   ondansetron (ZOFRAN) injection 4 mg (4 mg Intravenous Given 6/27/20 1015)   iohexol (OMNIPAQUE) 350 MG/ML injection (MULTI-DOSE) 100 mL (100 mL Intravenous Given 6/27/20 1111)   morphine injection 2 mg (2 mg Intravenous Given 6/27/20 1322)       Diagnostic Studies  Results Reviewed     Procedure Component Value Units Date/Time    Lactic acid 2 Hours [[id number]]  (Abnormal) Collected:  06/27/20 1153    Lab Status:  Final result Specimen:  Blood from Arm, Right Updated:  06/27/20 1251     LACTIC ACID 4 5 mmol/L     Narrative:       Result may be elevated if tourniquet was used during collection      Urine Microscopic [[id number]]  (Abnormal) Collected:  06/27/20 1150    Lab Status:  Final result Specimen:  Urine, Clean Catch Updated:  06/27/20 1226 RBC, UA 1-2 /hpf      WBC, UA 4-10 /hpf      Epithelial Cells Occasional /hpf      Bacteria, UA Occasional /hpf      Hyaline Casts, UA 2-4 /lpf     APTT [[id number]]  (Normal) Collected:  06/27/20 1153    Lab Status:  Final result Specimen:  Blood from Arm, Right Updated:  06/27/20 1225     PTT 34 seconds     Protime-INR [[id number]]  (Abnormal) Collected:  06/27/20 1153    Lab Status:  Final result Specimen:  Blood from Arm, Right Updated:  06/27/20 1225     Protime 17 5 seconds      INR 1 41    UA w Reflex to Microscopic w Reflex to Culture [[id number]]  (Abnormal) Collected:  06/27/20 1150    Lab Status:  Final result Specimen:  Urine, Clean Catch Updated:  06/27/20 1216     Color, UA Beatriz     Clarity, UA Clear     Specific Craig, UA 1 020     pH, UA 6 0     Leukocytes, UA Trace     Nitrite, UA Negative     Protein, UA 30 (1+) mg/dl      Glucose, UA Negative mg/dl      Ketones, UA Negative mg/dl      Urobilinogen, UA 1 0 E U /dl      Bilirubin, UA Negative     Blood, UA Negative    Lipase [[id number]]  (Abnormal) Collected:  06/27/20 0944    Lab Status:  Final result Specimen:  Blood from Arm, Right Updated:  06/27/20 1102     Lipase 7,852 u/L     NT-BNP PRO [[id number]]  (Abnormal) Collected:  06/27/20 0944    Lab Status:  Final result Specimen:  Blood from Arm, Right Updated:  06/27/20 1102     NT-proBNP 7,517 pg/mL     CBC and differential [[id number]]  (Abnormal) Collected:  06/27/20 0944    Lab Status:  Final result Specimen:  Blood from Arm, Right Updated:  06/27/20 1050     WBC 19 56 Thousand/uL      RBC 5 76 Million/uL      Hemoglobin 16 5 g/dL      Hematocrit 50 5 %      MCV 88 fL      MCH 28 6 pg      MCHC 32 7 g/dL      RDW 15 0 %      MPV 10 0 fL      Platelets 808 Thousands/uL      nRBC 0 /100 WBCs     Narrative: This is an appended report  These results have been appended to a previously verified report      Lactic acid [[id number]]  (Abnormal) Collected:  06/27/20 0944    Lab Status:  Final result Specimen:  Blood from Arm, Right Updated:  06/27/20 1050     LACTIC ACID 5 6 mmol/L     Narrative:       Result may be elevated if tourniquet was used during collection  Troponin I [[id number]]  (Normal) Collected:  06/27/20 0944    Lab Status:  Final result Specimen:  Blood from Arm, Right Updated:  06/27/20 1045     Troponin I <0 02 ng/mL     Comprehensive metabolic panel [[id number]]  (Abnormal) Collected:  06/27/20 0944    Lab Status:  Final result Specimen:  Blood from Arm, Right Updated:  06/27/20 1041     Sodium 137 mmol/L      Potassium 3 8 mmol/L      Chloride 100 mmol/L      CO2 24 mmol/L      ANION GAP 13 mmol/L      BUN 24 mg/dL      Creatinine 1 38 mg/dL      Glucose 157 mg/dL      Calcium 9 3 mg/dL       U/L       U/L      Alkaline Phosphatase 108 U/L      Total Protein 7 2 g/dL      Albumin 3 3 g/dL      Total Bilirubin 1 30 mg/dL      eGFR 34 ml/min/1 73sq m     Narrative:       Cr guidelines for Chronic Kidney Disease (CKD):     Stage 1 with normal or high GFR (GFR > 90 mL/min/1 73 square meters)    Stage 2 Mild CKD (GFR = 60-89 mL/min/1 73 square meters)    Stage 3A Moderate CKD (GFR = 45-59 mL/min/1 73 square meters)    Stage 3B Moderate CKD (GFR = 30-44 mL/min/1 73 square meters)    Stage 4 Severe CKD (GFR = 15-29 mL/min/1 73 square meters)    Stage 5 End Stage CKD (GFR <15 mL/min/1 73 square meters)  Note: GFR calculation is accurate only with a steady state creatinine    Blood culture #2 [[id number]] Collected:  06/27/20 0944    Lab Status: In process Specimen:  Blood from Arm, Right Updated:  06/27/20 1024    Blood culture #1 [[id number]] Collected:  06/27/20 1012    Lab Status: In process Specimen:  Blood from Hand, Left Updated:  06/27/20 1024    POCT urinalysis dipstick [[id number]]     Lab Status:  No result Specimen:  Urine                  CT chest abdomen pelvis w contrast   Final Result by Aly Razo DO (06/27 1206)   1  Mild prominence of the pulmonary interstitial markings with bilateral upper lobe groundglass infiltrates, right side greater than left  Findings are likely cardiac in etiology  In the setting of clinically suspected/proven COVID-19, the above lung    parenchymal findings on CT indicate intermediate confidence level for COVID-19       2   Bilateral lower lobe infiltrates, right side worse than left with intervening air bronchograms  Differential includes atelectasis or pneumonia  3   Abnormal appearance of the gallbladder, most compatible with acute cholecystitis  Although no calcified gallstones are seen within the gallbladder, the distal common bile duct is mildly dilated with several small opaque filling defects, suspicious    for calculi  Consider follow-up right upper quadrant ultrasound or nuclear medicine HIDA scan  4   Abnormal appearance of the pancreatic head with surrounding peripancreatic edema  Correlate for gallstone pancreatitis  5   Abnormal appearance of the pylorus and proximal duodenum  Although the findings likely represent reactive inflammation secondary to pancreatitis, mild gastritis/peptic ulcer disease not excluded  6   Abnormal appearance of the right colon, likely reactive  7   Abnormal appearance of the distal small bowel, likely reactive ileus  I personally discussed this study with JAMEEL Carvalho on 6/27/2020 at 12:01 PM                Workstation performed: [id number]         CT head without contrast   Final Result by José Lam DO (06/27 1142)   Stable cerebral atrophy with chronic small vessel ischemic white matter disease  No acute intracranial abnormality                    Workstation performed: [id number]                    Procedures  Procedures         ED Course                                             MDM  Number of Diagnoses or Management Options  Acute gallstone pancreatitis:   CHF (congestive heart failure) [facility]): COVID-19:      Amount and/or Complexity of Data Reviewed  Clinical lab tests: ordered and reviewed  Tests in the radiology section of CPT®: ordered and reviewed    Risk of Complications, Morbidity, and/or Mortality  Presenting problems: moderate  General comments: NH , confirmed that the patient tested positive for COVID a few days ago  I had a long discussion with the patient's daughter and son  Her daughter, Yeyo Nichols, is her POA  I went over all the pt 's labs and CT scan findings and talked about her poor prognosis  Dr Arzella Fabry, surgeon on call,  was also consulted on the case and spoke to the family  The family has decided to make her hospice, comfort care only  Hospitalist to admit patient          Disposition  Final diagnoses:   COVID-19   Acute gallstone pancreatitis   CHF (congestive heart failure) ([facility] [address] )     Time reflects when diagnosis was documented in both MDM as applicable and the Disposition within this note     Time User Action Codes Description Comment    6/27/2020  3:15 PM Vola Render R Add [U07 1] COVID-19     6/27/2020  3:15 PM Christopher [address] R Add [K85 10] Acute gallstone pancreatitis     6/27/2020  3:15 PM Christopher [address] R Add [I50 9] CHF (congestive heart failure) [facility])       ED Disposition     ED Disposition Condition Date/Time Comment    Admit Stable Sat Jun 27, 2020  1:43 PM Case was discussed with VIDHYA  and the patient's admission status was agreed to be inpt/med surg          Follow-up Information    None         Current Discharge Medication List      CONTINUE these medications which have NOT CHANGED    Details   albuterol (2 5 mg/3 mL) 0 083 % nebulizer solution Take 2 5 mg by nebulization daily      amLODIPine (NORVASC) 10 mg tablet Take 10 mg by mouth daily      apixaban (ELIQUIS) 2 5 mg Take 1 tablet (2 5 mg total) by mouth 2 (two) times a day  Refills: 0    Associated Diagnoses: Paroxysmal atrial fibrillation (HCC)      bisacodyl (DULCOLAX) 10 mg suppository Insert 10 mg into the rectum      calcium carbonate (OS-RUPESH) 600 MG tablet Take 600 mg by mouth every 6 (six) hours as needed for heartburn      donepezil (ARICEPT) 10 mg tablet Take 10 mg by mouth      DULoxetine (CYMBALTA) 60 mg delayed release capsule Take 30 mg by mouth daily       !! LORazepam (ATIVAN) 0 5 mg tablet Take 0 5 mg by mouth every 8 (eight) hours as needed for anxiety      !! LORazepam (ATIVAN) 0 5 mg tablet lorazepam 0 5 mg tablet      magnesium hydroxide (MILK OF MAGNESIA) 800 MG/5ML suspension Take 30 mL by mouth daily as needed for constipation      melatonin 3 mg Take 3 mg by mouth      metoprolol tartrate (LOPRESSOR) 50 mg tablet Take 50 mg by mouth 2 (two) times a day      mineral oil enema Insert 1 enema into the rectum daily as needed for constipation      omeprazole (PriLOSEC) 20 mg delayed release capsule Take 20 mg by mouth daily in the early morning      predniSONE 5 mg tablet prednisone 5 mg tablet       !! - Potential duplicate medications found  Please discuss with provider  No discharge procedures on file      PDMP Review     None          ED Provider  Electronically Signed by           Destiny Pritchett MD  06/27/20 250 [facility] Jessy Mendez MD  06/27/20 1000 [facility] Jessy Mendez MD  06/27/20 5235

## 2020-06-27 NOTE — CONSULTS
Consultation - General Surgery   Clyde Kanner 80 y o  female MRN: [id number]  Unit/Bed#: ED 30 Encounter: [id number]    Assessment/Plan     Assessment:  Covid19 infection  Severe dementia  Sepsis POA  Pancreatitis    Plan:  Long discussion with family in regards to patient's advanced age, multiple co-morbidities, severe dementia, active covid, pancreatitis and sepsis and they understand that her 27 day mortality rate is easily above 80% and have decided to proceed with comfort care/hospice at this point which I believe is quite reasonable, in regards to her pancreatitis it may be gallstone pancreatitis but there have been reports of covid related pancreatitis in the literature  History of Present Illness   History, ROS and PFSH unobtainable from any source due to has severe dementia   Presents from [address] with abdominal pain and recent diagnosis of Covid-19        Consult to surgery general  Consult performed by: Ashutosh Hermosillo MD  Consult ordered by: Luis Armando Copeland MD          Review of Systems   Unable to perform ROS: Dementia       Historical Information   Past Medical History:   Diagnosis Date    Ambulatory dysfunction     Anxiety     Toscano's esophagus     Dementia ([facility] [address] )     Diverticulitis     GERD (gastroesophageal reflux disease)     Hypertension     Pneumonia      Past Surgical History:   Procedure Laterality Date    HYSTERECTOMY      TOTAL HIP ARTHROPLASTY       Social History   Social History     Substance and Sexual Activity   Alcohol Use No    Alcohol/week: 1 0 standard drinks    Types: 1 Glasses of wine per week     Social History     Substance and Sexual Activity   Drug Use No     E-Cigarette/Vaping    E-Cigarette Use Never User      E-Cigarette/Vaping Substances     Social History     Tobacco Use   Smoking Status Former Smoker   Smokeless Tobacco Never Used     Family History: non-contributory    Meds/Allergies   all current active meds have been reviewed  Allergies   Allergen Reactions    Other       Hot pepper    Penicillins     Strawberry C [Ascorbate]     Tylenol [Acetaminophen]        Objective   First Vitals:   Blood Pressure: (!) 173/72 (06/27/20 0930)  Pulse: 94 (06/27/20 0930)  Temperature: 98 7 °F (37 1 °C) (06/27/20 0927)  Temp Source: Oral (06/27/20 0927)  Respirations: 20 (06/27/20 0930)  Weight - Scale: 77 7 kg (171 lb 4 8 oz) (06/27/20 0930)  SpO2: 93 % (06/27/20 0930)    Current Vitals:   Blood Pressure: 134/59 (06/27/20 1444)  Pulse: (!) 114 (06/27/20 1444)  Temperature: 98 7 °F (37 1 °C) (06/27/20 0927)  Temp Source: Oral (06/27/20 0927)  Respirations: 18 (06/27/20 1444)  Weight - Scale: 77 7 kg (171 lb 4 8 oz) (06/27/20 0930)  SpO2: 93 % (06/27/20 1444)      Intake/Output Summary (Last 24 hours) at 6/27/2020 1446  Last data filed at 6/27/2020 1318  Gross per 24 hour   Intake 500 ml   Output 75 ml   Net 425 ml       Invasive Devices     Peripheral Intravenous Line            Peripheral IV 06/27/20 Right;Ventral (anterior) Forearm less than 1 day                Physical Exam   Constitutional: She is oriented to person, place, and time  She appears well-developed and well-nourished  No distress  HENT:   Head: Normocephalic and atraumatic  Right Ear: External ear normal    Left Ear: External ear normal    Nose: Nose normal    Mouth/Throat: Oropharynx is clear and moist  No oropharyngeal exudate  Eyes: Pupils are equal, round, and reactive to light  Conjunctivae and EOM are normal  Right eye exhibits no discharge  Left eye exhibits no discharge  No scleral icterus  Neck: Normal range of motion  Neck supple  No JVD present  No tracheal deviation present  No thyromegaly present  Cardiovascular: Normal rate, regular rhythm, normal heart sounds and intact distal pulses  Exam reveals no gallop and no friction rub  No murmur heard  Pulmonary/Chest: No stridor  She is in respiratory distress  She has wheezes  She has no rales   She exhibits no tenderness  Abdominal: Soft  Bowel sounds are normal  She exhibits distension  She exhibits no mass  There is tenderness  There is guarding  There is no rebound  No hernia  Musculoskeletal: Normal range of motion  She exhibits no edema, tenderness or deformity  Lymphadenopathy:     She has no cervical adenopathy  Neurological: She is alert and oriented to person, place, and time  Skin: Skin is warm and dry  No rash noted  She is not diaphoretic  No erythema  No pallor  Psychiatric: She has a normal mood and affect  Her behavior is normal  Judgment and thought content normal    Nursing note and vitals reviewed  Lab Results:   I have personally reviewed pertinent lab results    , CBC:   Lab Results   Component Value Date    WBC 19 56 (H) 06/27/2020    HGB 16 5 (H) 06/27/2020    HCT 50 5 (H) 06/27/2020    MCV 88 06/27/2020     06/27/2020    MCH 28 6 06/27/2020    MCHC 32 7 06/27/2020    RDW 15 0 06/27/2020    MPV 10 0 06/27/2020    NRBC 0 06/27/2020   , CMP:   Lab Results   Component Value Date    SODIUM 137 06/27/2020    K 3 8 06/27/2020     06/27/2020    CO2 24 06/27/2020    BUN 24 06/27/2020    CREATININE 1 38 (H) 06/27/2020    CALCIUM 9 3 06/27/2020     (H) 06/27/2020     (H) 06/27/2020    ALKPHOS 108 06/27/2020    EGFR 34 06/27/2020   , Coagulation:   Lab Results   Component Value Date    INR 1 41 (H) 06/27/2020   , Urinalysis:   Lab Results   Component Value Date    COLORU Beatriz 06/27/2020    CLARITYU Clear 06/27/2020    SPECGRAV 1 020 06/27/2020    PHUR 6 0 06/27/2020    LEUKOCYTESUR Trace (A) 06/27/2020    NITRITE Negative 06/27/2020    GLUCOSEU Negative 06/27/2020    KETONESU Negative 06/27/2020    BILIRUBINUR Negative 06/27/2020    BLOODU Negative 06/27/2020   , Amylase: No results found for: AMYLASE, Lipase:   Lab Results   Component Value Date    LIPASE 7,852 (H) 06/27/2020     Imaging: I have personally reviewed pertinent films in PACS I personally reviewed all films in these case  EKG, Pathology, and Other Studies: I have personally reviewed pertinent films in PACS

## 2020-06-27 NOTE — H&P
Patient Information: Benji Crawley 80 y o  female MRN: [id number]  Unit/Bed#: [facility] [address] 224-02 Encounter: [id number]  Admitting Physician: Giovani Khan PA-C  PCP: Kay Mccormack MD  Date of Admission:  06/27/20    Assessment/Plan:    Hospital Problem List:   · Severe sepsis with lactic acidosis  · COVID pneumonia  · Acute respiratory failure with hypoxia  · Acute cholecystitis  · Acute pancreatitis- possible gallstone pancreatitis  · Transaminitis  · Acute heart failure  · Small bilateral plural effusions R > L  · Dementia  · Essential hypertension  · Atrial fibrillation  · Anxiety/depression  · GERD    Had extensive discussion in the ED with family in regards to patient care plan  Two daughters and son were present for discussion  They are in agreement to proceed with level 4 DNR comfort care/hospice given the patient's advanced age, dementia, and severity of acute illness consisting of acute pancreatitis, acute cholecystitis, active COVID, acute heart failure  They have already spoke with Dr Shubham Elizondo who agrees with their decision  The family's main goal is for the patient to not suffer or be in pain  In regards to treatment they are agreeable for oxygen but decline a shelton  They request to proceed with scheduled pain control rather than on an as needed basis  They are aware and understand the severity of her illness at this time and want her to "go peacefully " Will hold morphine for sedation  Ativan will be available as needed  Hospice consult has been placed  Vanessa Summers is to be called with updates and is the main contact for the family  She can be reached at [phone]  VTE Prophylaxis:  None- comfort care  Code Status:  Level 4 DNR- comfort care/hospice  Anticipated Length of Stay:  Patient will be admitted on an Inpatient basis with an anticipated length of stay of  greater than 2 midnights   Justification for Hospital Stay:  Comfort care    Chief Complaint:   Sent in from [facility] - vomiting / abdominal pain    History of Present Illness:    Marifer Nguyen is a 80 y o  female who presents from [facility] with active vomiting and severe abdominal pain  She was recently diagnosed with COVID on 6/19/20  Extensive discussion had with family in regards to patient care plan  For further details please refer to above  Review of Systems:    General:   No Fever or chills; No significant weight loss or gain  EENT:   No ear pain, facial swelling; No sneezing, sore throat  Skin:   No rashes, color changes  Respiratory:     No shortness of breath, cough, wheezing, stridor  Cardiovascular:     No chest pain, palpitations  Gastrointestinal:    No nausea, vomiting, diarrhea; No abdominal pain  Musculoskeletal:     No arthralgias, myalgias, swelling  Neurologic:   No dizziness, numbness, weakness  No speech difficulties  Psych:   No agitation, suicidal ideations      Otherwise, All other twelve-point review of systems normal    Unable to obtain accurate ROS given underlying dementia    Past Medical and Surgical History:     Past Medical History:   Diagnosis Date    Ambulatory dysfunction     Anxiety     Toscano's esophagus     Dementia (HCC)     Diverticulitis     GERD (gastroesophageal reflux disease)     Hypertension     Pneumonia        Past Surgical History:   Procedure Laterality Date    HYSTERECTOMY      TOTAL HIP ARTHROPLASTY         Meds/Allergies:    Current Facility-Administered Medications   Medication Dose Route Frequency Provider Last Rate Last Dose    artificial tear (LUBRIFRESH P M ) ophthalmic ointment   Both Eyes 4x Daily PRN Milvia Xavier PA-C        LORazepam (ATIVAN) injection 0 5 mg  0 5 mg Intravenous Q2H PRN Prashanth Marcial PA-C        morphine injection 1 mg  1 mg Intravenous Q4H PRN Kobe Barry DO        morphine oral concentrated solution 5 mg  5 mg Oral Q4H Milvia Xavier PA-C   5 mg at 06/27/20 1628       Allergies Allergen Reactions    Other       Hot pepper    Penicillins     Strawberry C [Ascorbate]     Tylenol [Acetaminophen]        Allergies: Allergies   Allergen Reactions    Other       Hot pepper    Penicillins     Strawberry C [Ascorbate]     Tylenol [Acetaminophen]        Social History:     Marital Status: /Civil Union     Substance Use History:   Social History     Substance and Sexual Activity   Alcohol Use No    Alcohol/week: 1 0 standard drinks    Types: 1 Glasses of wine per week     Social History     Tobacco Use   Smoking Status Former Smoker   Smokeless Tobacco Never Used     Social History     Substance and Sexual Activity   Drug Use No       Family History:    non-contributory    Physical Exam:     Vitals:   Blood Pressure: 131/73 (06/27/20 1547)  Pulse: (!) 121 (06/27/20 1547)  Temperature: 98 4 °F (36 9 °C) (06/27/20 1547)  Temp Source: Oral (06/27/20 0927)  Respirations: (!) 28 (06/27/20 1547)  Weight - Scale: 77 7 kg (171 lb 4 8 oz) (06/27/20 0930)  SpO2: 94 % (06/27/20 1547)    Physical Exam   Constitutional: No distress  HENT:   Head: Normocephalic and atraumatic  Cardiovascular: Normal rate, regular rhythm and normal heart sounds  Pulmonary/Chest: No stridor  No respiratory distress  She has no wheezes  She has no rales  She exhibits no tenderness  Course breath sounds  On 2 L NC   Abdominal: Soft  Bowel sounds are normal  She exhibits no distension and no mass  There is tenderness  There is no rebound and no guarding  No hernia  Morbidly obese   Neurological: She is alert  Oriented to person only  Thinks she is still at [facility]   Skin: Skin is warm and dry  She is not diaphoretic  Psychiatric: She has a normal mood and affect  Her behavior is normal    Nursing note and vitals reviewed  Additional Data:     Lab Results: I have personally reviewed pertinent reports        Results from last 7 days   Lab Units 06/27/20  0944   WBC Thousand/uL 19 56*   HEMOGLOBIN g/dL 16 5*   HEMATOCRIT % 50 5*   PLATELETS Thousands/uL 276   LYMPHO PCT % 2*   MONO PCT % 5   EOS PCT % 0     Results from last 7 days   Lab Units 06/27/20  0944   POTASSIUM mmol/L 3 8   CHLORIDE mmol/L 100   CO2 mmol/L 24   BUN mg/dL 24   CREATININE mg/dL 1 38*   CALCIUM mg/dL 9 3   ALK PHOS U/L 108   ALT U/L 131*   AST U/L 104*     Results from last 7 days   Lab Units 06/27/20  1153   INR  1 41*       Imaging: I have personally reviewed pertinent reports  Ct Head Without Contrast    Result Date: 6/27/2020  Narrative: CT BRAIN - WITHOUT CONTRAST INDICATION:   change in mental status  Abdominal pain, fever and vomiting  Decreased mental status today  Patient has confirmed COVID-19  COMPARISON:  CT brain July 16, 2011 TECHNIQUE:  CT examination of the brain was performed  In addition to axial images, coronal 2D reformatted images were created and submitted for interpretation  Radiation dose length product (DLP) for this visit:  819 mGy-cm   This examination, like all CT scans performed in the [facility], was performed utilizing techniques to minimize radiation dose exposure, including the use of iterative reconstruction and automated exposure control  IMAGE QUALITY:  Diagnostic  FINDINGS: PARENCHYMA: Decreased attenuation is noted in periventricular and subcortical white matter demonstrating an appearance that is statistically most likely to represent moderate microangiopathic change  Chronic lacunar infarction(s) are noted in basal ganglia, unchanged from prior exam  No CT signs of acute infarction  No intracranial mass, mass effect or midline shift  No acute parenchymal hemorrhage  Bilateral internal carotid artery and vertebral artery calcifications  VENTRICLES AND EXTRA-AXIAL SPACES:  Enlargement of ventricles and extra-axial CSF spaces, out of proportion to the patient's age most consistent with cerebral and cerebellar atrophy   VISUALIZED ORBITS AND PARANASAL SINUSES:  No acute abnormality involving the orbits  Mild scattered sinus mucosal thickening is noted  No fluid levels are seen  CALVARIUM AND EXTRACRANIAL SOFT TISSUES:  Normal      Impression: Stable cerebral atrophy with chronic small vessel ischemic white matter disease  No acute intracranial abnormality  Workstation performed: [id number]     Ct Chest Abdomen Pelvis W Contrast    Result Date: 6/27/2020  Narrative: CT CHEST, ABDOMEN AND PELVIS WITH IV CONTRAST INDICATION:   abd  pain  Abdominal pain, fever and vomiting  Decreased mental status today  Patient has confirmed COVID-19  COMPARISON:  CT abdomen pelvis December 12, 2016 and CT chest September 9, 2013  TECHNIQUE: CT examination of the chest, abdomen and pelvis was performed  Axial, sagittal, and coronal 2D reformatted images were created from the source data and submitted for interpretation  Radiation dose length product (DLP) for this visit:  1043 mGy-cm   This examination, like all CT scans performed in the [facility], was performed utilizing techniques to minimize radiation dose exposure, including the use of iterative reconstruction and automated exposure control  IV Contrast:  100 mL of iohexol (OMNIPAQUE) Enteric Contrast: Enteric contrast was not administered  FINDINGS: CHEST LUNGS:  Emphysematous changes are present in the lungs bilaterally  Mild prominence of the pulmonary interstitial markings  Diffuse groundglass infiltrates are present within the lungs bilaterally, most pronounced right upper lobe  More focal area of consolidations are present in the lower lobes bilaterally, right side  worse than left  Intervening air bronchograms  PLEURA:  Small bilateral pleural effusions, right side larger than left  HEART/GREAT VESSELS:  The heart is enlarged  Coronary artery calcifications  No evidence of a pericardial effusion  MEDIASTINUM AND UMBERTO:  Unremarkable  CHEST WALL AND LOWER NECK:   Unremarkable   ABDOMEN LIVER/BILIARY TREE: Fatty infiltrative changes in the liver  Multiple low-density lesions, most compatible with cysts  GALLBLADDER:  Incompletely distended  Mild wall enhancement  Moderate pericholecystic inflammation  Moderate amount of pericholecystic fluid  No calcified gallstones within the gallbladder  The distal common bile duct is distended measuring 13 mm (series 2, image 66)  Suggestion of filling defects in the distal common bile duct (series 2, image 66; series 601, image 76)  SPLEEN:  Unremarkable  PANCREAS:  The head of the pancreas is mildly enlarged and edematous  There is mild peripancreatic inflammation and peripancreatic fluid  ADRENAL GLANDS:  Unremarkable  KIDNEYS/URETERS:  Simple appearing bilateral renal cysts  No renal calyceal or ureteric calculi  STOMACH AND BOWEL:  Stomach distended and fluid-filled  Hiatal hernia  Mild edema of the walls of the mild edema and enhancement of the walls of the pylorus and proximal duodenum  Mild inflammatory changes surrounding the pylorus and proximal duodenum  The remainder of the duodenum appears normal  Proximal small bowel decompressed  Distal small bowel mildly distended and fluid-filled  The cecum is normally distended with feces  The distal ascending colon and proximal transverse colon are decompressed  Mild wall enhancement  Distally, the colon is relatively decompressed  There are scattered diverticula throughout the sigmoid colon  Sigmoid colon redundant and tortuous  Nothing to suggest acute diverticulitis  APPENDIX: Not clearly identified  No findings to suggest appendicitis  ABDOMINOPELVIC CAVITY:  Mild amount of fluid throughout the retroperitoneum  No collections to suggest a pseudocyst   Mild amount of ascites within the abdomen and pelvis  VESSELS:  Significant atherosclerotic changes are present in the abdominal aorta and its branch vessels    There is severe stenosis of the distal abdominal aorta, at the level of the common iliac bifurcation  Severe narrowing of the common iliac arteries  bilaterally, right side worse than left  This extends inferiorly to the level of the common iliac bifurcation  PELVIS REPRODUCTIVE ORGANS:  Surgically absent  URINARY BLADDER:  Not well visualized due to beam hardening artifact from left hip arthroplasty  Bladder grossly unremarkable  ABDOMINAL WALL/INGUINAL REGIONS:  Fat-containing left inguinal hernia containing a small amount of ascites  OSSEOUS STRUCTURES:  No acute fracture or destructive osseous lesion  Spinal degenerative changes are noted  Postoperative changes left hip  Degenerative changes in the shoulders bilaterally, left side worse than right  Impression: 1  Mild prominence of the pulmonary interstitial markings with bilateral upper lobe groundglass infiltrates, right side greater than left  Findings are likely cardiac in etiology  In the setting of clinically suspected/proven COVID-19, the above lung parenchymal findings on CT indicate intermediate confidence level for COVID-19  2   Bilateral lower lobe infiltrates, right side worse than left with intervening air bronchograms  Differential includes atelectasis or pneumonia  3   Abnormal appearance of the gallbladder, most compatible with acute cholecystitis  Although no calcified gallstones are seen within the gallbladder, the distal common bile duct is mildly dilated with several small opaque filling defects, suspicious for calculi  Consider follow-up right upper quadrant ultrasound or nuclear medicine HIDA scan  4   Abnormal appearance of the pancreatic head with surrounding peripancreatic edema  Correlate for gallstone pancreatitis  5   Abnormal appearance of the pylorus and proximal duodenum  Although the findings likely represent reactive inflammation secondary to pancreatitis, mild gastritis/peptic ulcer disease not excluded  6   Abnormal appearance of the right colon, likely reactive   7   Abnormal appearance of the distal small bowel, likely reactive ileus  I personally discussed this study with JAMEEL Plata on 6/27/2020 at 12:01 PM  Workstation performed: [id number]       EKG, Pathology, and Other Studies Reviewed on Admission:   · EKG: none    Allscripts Records Reviewed: Yes     Total Time for Visit, including Counseling / Coordination of Care: 45 minutes  Greater than 50% of this total time spent on direct patient counseling and coordination of care  ** Please Note: This note has been constructed using a voice recognition system   **

## 2020-06-27 NOTE — SOCIAL WORK
Consult received for Hospice eval- referral made to assess for IP eligibility  Pt is a LTC resident at [facility] with referral back made  Will follow up on determination for Hospice LOC and making arrangements thereafter for anticipated d/c Sunday

## 2020-06-27 NOTE — ED NOTES
Pt requesting water at his time  Pt made aware no water can be provided  Pt provided moist mouth swabs and tia Dickey  06/27/20 9421

## 2020-06-28 NOTE — PLAN OF CARE
Problem: Potential for Falls  Goal: Patient will remain free of falls  Description  INTERVENTIONS:  - Assess patient frequently for physical needs  -  Identify cognitive and physical deficits and behaviors that affect risk of falls    -  [place] fall precautions as indicated by assessment   - Educate patient/family on patient safety including physical limitations  - Instruct patient to call for assistance with activity based on assessment  - Modify environment to reduce risk of injury  - Consider OT/PT consult to assist with strengthening/mobility  Outcome: Progressing     Problem: Prexisting or High Potential for Compromised Skin Integrity  Goal: Skin integrity is maintained or improved  Description  INTERVENTIONS:  - Identify patients at risk for skin breakdown  - Assess and monitor skin integrity  - Assess and monitor nutrition and hydration status  - Monitor labs   - Assess for incontinence   - Turn and reposition patient  - Assist with mobility/ambulation  - Relieve pressure over bony prominences  - Avoid friction and shearing  - Provide appropriate hygiene as needed including keeping skin clean and dry  - Evaluate need for skin moisturizer/barrier cream  - Collaborate with interdisciplinary team   - Patient/family teaching  - Consider wound care consult   Outcome: Progressing     Problem: PAIN - ADULT  Goal: Verbalizes/displays adequate comfort level or baseline comfort level  Description  Interventions:  - Encourage patient to monitor pain and request assistance  - Assess pain using appropriate pain scale  - Administer analgesics based on type and severity of pain and evaluate response  - Implement non-pharmacological measures as appropriate and evaluate response  - Consider cultural and social influences on pain and pain management  - Notify physician/advanced practitioner if interventions unsuccessful or patient reports new pain  Outcome: Progressing     Problem: INFECTION - ADULT  Goal: Absence or prevention of progression during hospitalization  Description  INTERVENTIONS:  - Assess and monitor for signs and symptoms of infection  - Monitor lab/diagnostic results  - Monitor all insertion sites, i e  indwelling lines, tubes, and drains  - Monitor endotracheal if appropriate and nasal secretions for changes in amount and color  - [place] appropriate cooling/warming therapies per order  - Administer medications as ordered  - Instruct and encourage patient and family to use good hand hygiene technique  - Identify and instruct in appropriate isolation precautions for identified infection/condition  Outcome: Progressing     Problem: SAFETY ADULT  Goal: Patient will remain free of falls  Description  INTERVENTIONS:  - Assess patient frequently for physical needs  -  Identify cognitive and physical deficits and behaviors that affect risk of falls    -  [place] fall precautions as indicated by assessment   - Educate patient/family on patient safety including physical limitations  - Instruct patient to call for assistance with activity based on assessment  - Modify environment to reduce risk of injury  - Consider OT/PT consult to assist with strengthening/mobility  Outcome: Progressing     Problem: DISCHARGE PLANNING  Goal: Discharge to home or other facility with appropriate resources  Description  INTERVENTIONS:  - Identify barriers to discharge w/patient and caregiver  - Arrange for needed discharge resources and transportation as appropriate  - Identify discharge learning needs (meds, wound care, etc )  - Arrange for interpretive services to assist at discharge as needed  - Refer to Case Management Department for coordinating discharge planning if the patient needs post-hospital services based on physician/advanced practitioner order or complex needs related to functional status, cognitive ability, or social support system  Outcome: Progressing     Problem: Knowledge Deficit  Goal: Patient/family/caregiver demonstrates understanding of disease process, treatment plan, medications, and discharge instructions  Description  Complete learning assessment and assess knowledge base    Interventions:  - Provide teaching at level of understanding  - Provide teaching via preferred learning methods  Outcome: Progressing

## 2020-06-28 NOTE — HOSPICE NOTE
Late entry for 6 27 2020 1730  Pt evaluated, all medical reviewed with hospice physician  Pt is approved and appropriate for routine LOC for hospice that can be provided at pts SNF   pts bedside nurse in reference to the above  Will re evaluate pt in am for potential decline  Liaison will follow

## 2020-06-28 NOTE — DEATH NOTE
INPATIENT DEATH NOTE  Chaz Langley 80 y o  female MRN: [id number]  Unit/Bed#: [facility] 2 [facility] 87 224-02 Encounter: [id number]    Date, Time and Cause of Death    Date of Death:  20  Time of Death:   5:19 AM  Preliminary Cause of Death:  Pneumonia due to COVID-19 virus  Entered by:  Batsheva hayes[MH1 1]     Attribution     MH1 1 BARBRA Cummings 20 05:58           Patient's Information  Pronounced by: Niranjan Waggoner  Did the patient's death occur in the ED?: No  Did the patient's death occur in the OR?: No  Did the patient's death occur less than 10 days post-op?: No  Did the patient's death occur within 24 hours of admission?: Yes  Was code status DNR at the time of death?: Yes    PHYSICAL EXAM:  Unresponsive to noxious stimuli and Spontaneous respirations absent    Medical Examiner notification criteria:  NONE APPLICABLE   Medical Examiner's office notified?:  No, does not meet ME notification criteria   Medical Examiner accepted case?:  No  Name of Medical Examiner: n/a    Family Notification  Was the family notified?: Yes  Date Notified: 20  Notified by: Johana Dancer  Name of Family Notified of Death: Bushra Kerr   Relationship to Patient: Daughter  Family Notification Route: Telephone  Was the family told to contact a  home?: Yes    Autopsy Options:  Post-mortem examination declined by next of kin    Primary Service Attending Physician notified?:  yes - Attending:  No att  providers found    Physician/Resident responsible for completing Discharge Summary:  Ty Fine

## 2020-06-28 NOTE — PROGRESS NOTES
[address] Internal Medicine Pronouncement of Death  Patient: Beatrice Lee 80 y o  female   MRN: [id number]  PCP: Fabien Cook MD  Unit/Bed#: [facility] 87 224-02 Encounter: [id number]    Date of Admission:  6/27/2020  Date of Death: 06/28/20    Time of Death: 5:19a    Code Status at Time of Death: Level 4 - Comfort Care    Patient on Hospice?: yes    Family Notification?: yes, daughter donna    Autopsy Desired?: no    Suspected Cause of Death: Covid, sepsis, respiratory failure and heart failure    Hospital Problem List:     Principal Problem:    Severe sepsis ([facility] )  Active Problems:    Depression    Essential hypertension    Dementia ([facility] )    Paroxysmal atrial fibrillation (HCC)    Acute [facility] failure with hypoxia ([facility] )    COVID-19 virus detected    Acute cholecystitis    Acute pancreatitis      Pronouncement of Death: I was contacted by nursing staff to evaluate patient found without vital signs  Patient is identified visually and identification confirmed with identification bracelet  The patient is laying in bed with all lines and tubes intact  The patient is examined personally and no heart sounds heard nor pulse detected  No breath sounds after 2 minutes auscultation  Pupils are fixed and dilated   No corneal reflex present  The patient is pronounced dead on this date and time        BARBRA Correa

## 2020-07-01 NOTE — DISCHARGE SUMMARY
Discharge Summary - Beatrice Lee 80 y o  female MRN: [id number]    Unit/Bed#: [address] 224-02 Encounter: [id number] PCP: Fabien Cook MD    Admission Date:   Admission Orders (From admission, onward)     Ordered        20 1403  Inpatient Admission (expected length of stay for this patient Order details is greater than two midnights)  Once                     Admitting Diagnosis: CHF (congestive heart failure) ([facility] [address] ) [I50 9]  Abdominal pain [R10 9]  Acute gallstone pancreatitis [K85 10]  COVID-19 [U07 1]    HPI: Hospice pt with advanced dementia admitted last night for Covid PNA,   acute pancreatitis, acute cholecystitis and acute heart failure with B/L pleural effusions  Family agreed for level 4 DNR comfort care/hospice  Procedures Performed: No orders of the defined types were placed in this encounter        Summary of Hospital Course: patient passed due to multiple comorbidities    Significant Findings, Care, Treatment and Services Provided: Family decided for DNR comfort/hospice care      Disposition:      Final Diagnosis: COVID PNA    Resolved Problems  Date Reviewed: 2020    None          Date, Time and Cause of Death    Date of Death:  20  Time of Death:   5:19 AM  Preliminary Cause of Death:  Pneumonia due to COVID-19 virus  Entered by:  Batsheva Ceballos[MH1 1]     Attribution     MH1 1 BARBRA Campbell 20 05:58          Death Note:    INPATIENT DEATH NOTE  Beatrice Lee 80 y o  female MRN: [id number]  Unit/Bed#: [address] 224-02 Encounter: [id number]    Date, Time and Cause of Death    Date of Death:  20  Time of Death:   5:19 AM  Preliminary Cause of Death:  Pneumonia due to COVID-19 virus  Entered by:  Batsheva hayes[MH1 1]     Attribution     MH1 1 BARBRA Campbell 20 05:58           Patient's Information  Pronounced by: Susan Mayes  Did the patient's death occur in the ED?: No  Did the patient's death occur in the OR?: No  Did the patient's death occur less than 10 days post-op?: No  Did the patient's death occur within 24 hours of admission?: Yes  Was code status DNR at the time of death?: Yes    PHYSICAL EXAM:  Unresponsive to noxious stimuli and Spontaneous respirations absent    Medical Examiner notification criteria:  NONE APPLICABLE   Medical Examiner's office notified?:  No, does not meet ME notification criteria   Medical Examiner accepted case?:  No  Name of Medical Examiner: n/a    Family Notification  Was the family notified?: Yes  Date Notified: 20  Notified by: Lexie Chase  Name of Family Notified of Death: Treva Can   Relationship to Patient: Daughter  Family Notification Route: Telephone  Was the family told to contact a  home?: Yes    Autopsy Options:  Post-mortem examination declined by next of kin    Primary Service Attending Physician notified?:  yes - Attending:  No att  providers found    Physician/Resident responsible for completing Discharge Summary:  Arben Gordon

## 2020-07-02 LAB
BACTERIA BLD CULT: NORMAL
BACTERIA BLD CULT: NORMAL

## 2022-08-25 NOTE — SOCIAL WORK
CM spoke with pt and pt's daughter/POA Judith Jeremy re: arrangements that are set up to return to [facility] at 1230  SHERLYN#2 explained to Judith Luna with pt signing same- copy given as requested  No further d/c needs identified 
Pt admitted with dysphagia and choking episodes- work up has been negative with pt remaining free of episodes- pt on dysphagia diet (dental soft) w/ thin liquids per ST  Pt lives at [facility] and is a 15 day MA bed hold  Per staff she is a (S) with ADL's requiring VC's for thoroughness and sequencing  She ambulates in room with rollator however requires a w/c transport for longer distances  Denies legal issues or mental health issues  POA of healthcare and financials is daughter Axel Cortez (paperwork not on chart with daughter informed on importance of same- also asked Axel Cortez to inform SNF of her being POA healthcare as they were under impression she is only of finances)  Pt's PCP is Dr Guille Gordillo and medications are provided through [facility]  Family without further questions, agreeing to pt being transported via w/c [employer], verbally understanding possibility of receiving a bill  Per attending's note, if pt remains stable overnight may d/c back to SNF- Brianda and daughter/POA made aware of same and is agreeable  Transport arranged through [employer] for 1230  via w/c [employer]    Report to be called to [phone] and DCI fax to [phone]
Statement Selected

## 2023-02-06 NOTE — ASSESSMENT & PLAN NOTE
· Patient resides at [facility] in a monitored setting  · Continue dysphagia diet  · Fall precaution  · Full code  · Continue Aricept 10 mg daily  May give lorazepam as needed 0 5 mg every 6 hours for agitation    She was getting this at the home Imiquimod Counseling:  I discussed with the patient the risks of imiquimod including but not limited to erythema, scaling, itching, weeping, crusting, and pain.  Patient understands that the inflammatory response to imiquimod is variable from person to person and was educated regarded proper titration schedule.  If flu-like symptoms develop, patient knows to discontinue the medication and contact us.